# Patient Record
Sex: FEMALE | Race: AMERICAN INDIAN OR ALASKA NATIVE
[De-identification: names, ages, dates, MRNs, and addresses within clinical notes are randomized per-mention and may not be internally consistent; named-entity substitution may affect disease eponyms.]

---

## 2018-02-12 ENCOUNTER — HOSPITAL ENCOUNTER (EMERGENCY)
Dept: HOSPITAL 5 - ED | Age: 58
Discharge: HOME | End: 2018-02-12
Payer: SELF-PAY

## 2018-02-12 VITALS — SYSTOLIC BLOOD PRESSURE: 148 MMHG | DIASTOLIC BLOOD PRESSURE: 84 MMHG

## 2018-02-12 DIAGNOSIS — N39.0: Primary | ICD-10-CM

## 2018-02-12 DIAGNOSIS — M19.90: ICD-10-CM

## 2018-02-12 DIAGNOSIS — F17.200: ICD-10-CM

## 2018-02-12 LAB
ALBUMIN SERPL-MCNC: 4 G/DL (ref 3.9–5)
ALT SERPL-CCNC: 11 UNITS/L (ref 7–56)
BASOPHILS # (AUTO): 0 K/MM3 (ref 0–0.1)
BASOPHILS NFR BLD AUTO: 0.3 % (ref 0–1.8)
BILIRUB UR QL STRIP: (no result)
BLOOD UR QL VISUAL: (no result)
BUN SERPL-MCNC: 9 MG/DL (ref 7–17)
BUN/CREAT SERPL: 11 %
CALCIUM SERPL-MCNC: 9 MG/DL (ref 8.4–10.2)
EOSINOPHIL # BLD AUTO: 0 K/MM3 (ref 0–0.4)
EOSINOPHIL NFR BLD AUTO: 1 % (ref 0–4.3)
HCT VFR BLD CALC: 35.1 % (ref 30.3–42.9)
HEMOLYSIS INDEX: 7
HGB BLD-MCNC: 11.6 GM/DL (ref 10.1–14.3)
LYMPHOCYTES # BLD AUTO: 0.9 K/MM3 (ref 1.2–5.4)
LYMPHOCYTES NFR BLD AUTO: 26.7 % (ref 13.4–35)
MCH RBC QN AUTO: 29 PG (ref 28–32)
MCHC RBC AUTO-ENTMCNC: 33 % (ref 30–34)
MCV RBC AUTO: 87 FL (ref 79–97)
MONOCYTES # (AUTO): 0.4 K/MM3 (ref 0–0.8)
MONOCYTES % (AUTO): 11.4 % (ref 0–7.3)
NITRITE UR QL STRIP: (no result)
PH UR STRIP: 5 [PH] (ref 5–7)
PLATELET # BLD: 268 K/MM3 (ref 140–440)
RBC # BLD AUTO: 4.04 M/MM3 (ref 3.65–5.03)
RBC #/AREA URNS HPF: 112 /HPF (ref 0–6)
UROBILINOGEN UR-MCNC: < 2 MG/DL (ref ?–2)
WBC #/AREA URNS HPF: 25 /HPF (ref 0–6)

## 2018-02-12 PROCEDURE — 80053 COMPREHEN METABOLIC PANEL: CPT

## 2018-02-12 PROCEDURE — 96372 THER/PROPH/DIAG INJ SC/IM: CPT

## 2018-02-12 PROCEDURE — 36415 COLL VENOUS BLD VENIPUNCTURE: CPT

## 2018-02-12 PROCEDURE — 81001 URINALYSIS AUTO W/SCOPE: CPT

## 2018-02-12 PROCEDURE — 85025 COMPLETE CBC W/AUTO DIFF WBC: CPT

## 2018-02-12 PROCEDURE — 99283 EMERGENCY DEPT VISIT LOW MDM: CPT

## 2018-02-12 NOTE — EMERGENCY DEPARTMENT REPORT
ED Female  HPI





- General


Chief complaint: Back Pain/Injury


Stated complaint: BACK PAIN


Time Seen by Provider: 18 12:20


Source: patient


Mode of arrival: Ambulatory


Limitations: No Limitations





- History of Present Illness


Initial comments: 





This is a 57-year-old female nontoxic, well nourished in appearance, no acute 

signs of distress presents to the ED with c/o of dysuria, polyuria, urinary 

frequency and back pain x1 week. Patient describes pain as aching with level of 

8/10. Patient stated that back pain radiates to abdominal region. Patient also 

stated has history of arthirities and wants pain medications. Patient denies 

any trauma. Patient denies any hematuria, fever, chills, pelvic pain, chest pain

, shortness of breathe, numbness, tingling, headache, stiff neck.  Patient also 

stated wants to be treated emiprcally for possible STD. Patient denies any 

vaginal discharge or vaginal bleeding and stated that she had unprotective sex 

and wants to be treated. Patient denies any drug allergies. PMH includes brain 

aneurism. 


MD Complaint: dysuria


-: week(s) (1)


Radiation: non-radiating


Severity: mild


Severity scale (0 -10): 8


Quality: burning


Consistency: constant


Improves with: none


Worsens with: urination


Are you Pregnant Now?: No


Associated Symptoms: dysuria.  denies: vaginal discharge, vaginal bleeding, 

abdominal pain, nausea/vomiting, fever/chills, headaches, loss of appetite, 

hematuria, rash, seizure, shortness of breath, syncope, weakness





- Related Data


Sexually active: Yes


 Previous Rx's











 Medication  Instructions  Recorded  Last Taken  Type


 


ALBUTEROL Inhaler [ProAir HFA 2 puff IH QID PRN #1 inhalation 10/27/16 10/30/16 

Rx





Inhaler]    


 


traMADol [Ultram 50 MG tab] 50 mg PO Q6HR PRN #14 tablet 10/27/16 10/30/16 Rx


 


Levofloxacin [Levaquin] 750 mg PO QDAY #5 tablet 16 Unknown Rx


 


guaiFENesin ER [Mucinex ER] 600 mg PO BID #10 tablet 16 Unknown Rx


 


Sulfamethoxazole/Trimethoprim 1 each PO BID #14 tablet 16 Unknown Rx





[Bactrim DS TAB]    


 


Acetaminophen [Tylenol Arthritis] 650 mg PO Q8H PRN #30 tablet.er 18 

Unknown Rx


 


Ciprofloxacin HCl [Ciprofloxacin 500 mg PO Q12HR #14 tab 18 Unknown Rx





TAB]    











 Allergies











Allergy/AdvReac Type Severity Reaction Status Date / Time


 


No Known Allergies Allergy   Verified 16 08:53














ED Review of Systems


ROS: 


Stated complaint: BACK PAIN


Other details as noted in HPI





Constitutional: denies: chills, fever


Eyes: denies: eye pain, eye discharge, vision change


ENT: denies: ear pain, throat pain


Respiratory: denies: cough, shortness of breath, wheezing


Cardiovascular: denies: chest pain, palpitations


Endocrine: no symptoms reported


Gastrointestinal: denies: abdominal pain, nausea, diarrhea


Genitourinary: urgency, dysuria, frequency.  denies: hematuria, discharge


Musculoskeletal: back pain.  denies: joint swelling, arthralgia


Skin: denies: rash, lesions


Neurological: denies: headache, weakness, paresthesias


Psychiatric: denies: anxiety, depression


Hematological/Lymphatic: denies: easy bleeding, easy bruising





ED Past Medical Hx





- Past Medical History


Previous Medical History?: Yes


Hx Hypertension: No


Hx Heart Attack/AMI: No


Hx Congestive Heart Failure: No


Hx Diabetes: No


Hx Deep Vein Thrombosis: No


Hx Pulmonary Embolism: No


Hx Liver Disease: No


Hx Renal Disease: No


Hx Sickle Cell Disease: No


Hx Arthritis: No


Hx Seizures: No


Hx Kidney Stones: No


Hx Asthma: No


Hx COPD: No


Hx Tuberculosis: No


Hx Dementia: No


Hx HIV: No


Additional medical history: SAH.  brain aneurysm





- Surgical History


Past Surgical History?: Yes


Hx Coronary Stent: No


Hx Pacemaker: No


Hx Internal Defibrillator: No


Additional Surgical History: ANEURYSM REPAIR.   X 2





- Social History


Smoking Status: Current Every Day Smoker


Substance Use Type: Alcohol, Prescribed





- Medications


Home Medications: 


 Home Medications











 Medication  Instructions  Recorded  Confirmed  Last Taken  Type


 


ALBUTEROL Inhaler [ProAir HFA 2 puff IH QID PRN #1 inhalation 10/27/16 10/30/16 

10/30/16 Rx





Inhaler]     


 


traMADol [Ultram 50 MG tab] 50 mg PO Q6HR PRN #14 tablet 10/27/16 10/30/16 10/30

/16 Rx


 


Levofloxacin [Levaquin] 750 mg PO QDAY #5 tablet 16  Unknown Rx


 


guaiFENesin ER [Mucinex ER] 600 mg PO BID #10 tablet 16  Unknown Rx


 


Sulfamethoxazole/Trimethoprim 1 each PO BID #14 tablet 16  Unknown Rx





[Bactrim DS TAB]     


 


Acetaminophen [Tylenol Arthritis] 650 mg PO Q8H PRN #30 tablet.er 18  

Unknown Rx


 


Ciprofloxacin HCl [Ciprofloxacin 500 mg PO Q12HR #14 tab 18  Unknown Rx





TAB]     














ED Physical Exam





- General


Limitations: No Limitations


General appearance: alert, in no apparent distress





- Head


Head exam: Present: atraumatic, normocephalic





- Eye


Eye exam: Present: normal appearance, PERRL, EOMI


Pupils: Present: normal accommodation





- ENT


ENT exam: Present: normal exam, normal orophraynx, mucous membranes moist, TM's 

normal bilaterally, normal external ear exam





- Neck


Neck exam: Present: normal inspection, full ROM.  Absent: tenderness, 

meningismus, lymphadenopathy, thyromegaly





- Respiratory


Respiratory exam: Present: normal lung sounds bilaterally.  Absent: respiratory 

distress, wheezes, rales, rhonchi, stridor, chest wall tenderness, accessory 

muscle use, decreased breath sounds, prolonged expiratory





- Cardiovascular


Cardiovascular Exam: Present: regular rate, normal rhythm, normal heart sounds.

  Absent: bradycardia, tachycardia, irregular rhythm, systolic murmur, 

diastolic murmur, rubs, gallop





- GI/Abdominal


GI/Abdominal exam: Present: soft, normal bowel sounds.  Absent: distended, 

tenderness, guarding, rebound, rigid, diminished bowel sounds





- Expanded GI/Abdominal Exam


  ** Expanded


GI/Abdominal exam: Present: other (positive umbilical hernia that is reducible)

.  Absent: psoas sign, obturator sign, heel tap sign, Bernard's sign, Rovsing's 

sign, tenderness at [place], ascites





- Rectal


Rectal exam: Present: deferred





- Extremities Exam


Extremities exam: Present: normal inspection, full ROM, normal capillary 

refill.  Absent: tenderness, pedal edema, joint swelling, calf tenderness





- Back Exam


Back exam: Present: normal inspection, full ROM.  Absent: tenderness, CVA 

tenderness (R), CVA tenderness (L), muscle spasm, paraspinal tenderness, 

vertebral tenderness, rash noted





- Neurological Exam


Neurological exam: Present: alert, oriented X3, CN II-XII intact, normal gait, 

reflexes normal





- Psychiatric


Psychiatric exam: Present: normal affect, normal mood





- Skin


Skin exam: Present: warm, dry, intact, normal color.  Absent: rash





ED Course





 Vital Signs











  18





  10:19


 


Temperature 98.1 F


 


Pulse Rate 68


 


Respiratory 16





Rate 


 


Blood Pressure 148/84


 


O2 Sat by Pulse 98





Oximetry 














- Reevaluation(s)


Reevaluation #1: 





18 12:41


Patient is speaking in full sentences with no signs of distress noted.





- Consultations


Consultation #1: 





18 12:41


Patient has been consulted with Dr. Littlejohn about patient history, physical exam

, and labs and examined and screened patient and agrees to ED plan of care and 

discharge plan of care. 





ED Medical Decision Making





- Lab Data


Result diagrams: 


 18 10:45





 18 10:45





- Medical Decision Making





This is a 57-year-old female that presents with UTI. Patient is stable and was 

examined by me and Dr. Littlejohn. Labs within normal limits. UA indicates UTI. 

Normal abdominal exam. No CVA tenderness. Patient 1G azithromycin 1G Rocephine 

IV. Patient stated wants to be treated empirically for STD. Patient is 

discharged with cipro. Back pain and abdominal pain resolved after toradol. 

Patient stated feels much better.  Patient was instructed to Follow-up with a 

primary care doctor in 3-5 days or if symptoms worsen and continue return to 

emergency room as soon as possible.  At time of discharge, the patient does not 

seem toxic or ill in appearance.  No acute signs of distress noted.  Patient 

agrees to discharge treatment plan of care.  No further questions noted by the 

patient.


Critical care attestation.: 


If time is entered above; I have spent that time in minutes in the direct care 

of this critically ill patient, excluding procedure time.








ED Disposition


Clinical Impression: 


 Arthritis





UTI (urinary tract infection)


Qualifiers:


 Urinary tract infection type: site unspecified Hematuria presence: with 

hematuria Qualified Code(s): N39.0 - Urinary tract infection, site not specified

; R31.9 - Hematuria, unspecified; R31.9 - Hematuria, unspecified





Disposition: DC-01 TO HOME OR SELFCARE


Is pt being admited?: No


Does the pt Need Aspirin: No


Condition: Stable


Instructions:  Urinary Tract Infection in Women (ED), Ciprofloxacin (By mouth)


Additional Instructions: 


Follow-up with a primary care doctor in 3-5 days or if symptoms worsen and 

continue return to emergency room as soon as possible. 


Prescriptions: 


Acetaminophen [Tylenol Arthritis] 650 mg PO Q8H PRN #30 tablet.er


 PRN Reason: Pain


Ciprofloxacin HCl [Ciprofloxacin TAB] 500 mg PO Q12HR #14 tab


Referrals: 


PRIMARY CARE,MD [Primary Care Provider] - 3-5 Days


DAPHNE CORRALES MD [Staff Physician] - 3-5 Days


[facility] [place] [Outside] - 3-5 Days


[facility] [Outside] - 3-5 Days

## 2018-07-17 ENCOUNTER — HOSPITAL ENCOUNTER (EMERGENCY)
Dept: HOSPITAL 5 - ED | Age: 58
Discharge: HOME | End: 2018-07-17
Payer: MEDICAID

## 2018-07-17 VITALS — DIASTOLIC BLOOD PRESSURE: 87 MMHG | SYSTOLIC BLOOD PRESSURE: 156 MMHG

## 2018-07-17 DIAGNOSIS — F17.200: ICD-10-CM

## 2018-07-17 DIAGNOSIS — R10.13: Primary | ICD-10-CM

## 2018-07-17 PROCEDURE — 99282 EMERGENCY DEPT VISIT SF MDM: CPT

## 2018-07-17 NOTE — EMERGENCY DEPARTMENT REPORT
ED General Adult HPI





- General


Chief complaint: Pain General


Stated complaint: BACK/EAR/ABD PAIN


Time Seen by Provider: 18 10:21


Source: patient


Mode of arrival: Ambulatory


Limitations: No Limitations





- History of Present Illness


Initial comments: 





57-year-old female with a past medical history subarachnoid hemorrhage, an 

aneurysm and previous  presents to the hospital with multiple chronic 

complaints.





Complaint #1: Ongoing back pain and arthritis pain.  Patient has been 

experiencing arthritis pain 1 year.  Denies recent injury.  Denies fever, 

dysuria, hematuria, leg weakness, urinary incontinence.  She is currently not 

taking any medications for pain





Complaint #2: Epigastric pain.  Ongoing for 1-2 months.  Pain is aching, 

constant, worse with palpation.  Occasional nausea without vomiting, melena, 

hematochezia, or fever.





Patient also denies vaginal discharge, vaginal itching.  A she has a history of 

chronic constipation and takes stool softeners intermittently.  Last bowel 

movement was last night.





Patient not have a primary care doctor.





Primary medical record patient has a history of chronic alcoholism and chronic 

liver disease. She denies a history of seizures





- Related Data


 Previous Rx's











 Medication  Instructions  Recorded  Last Taken  Type


 


ALBUTEROL Inhaler [ProAir HFA 2 puff IH QID PRN #1 inhalation 10/27/16 10/30/16 

Rx





Inhaler]    


 


traMADol [Ultram 50 MG tab] 50 mg PO Q6HR PRN #14 tablet 10/27/16 10/30/16 Rx


 


Levofloxacin [Levaquin] 750 mg PO QDAY #5 tablet 16 Unknown Rx


 


guaiFENesin ER [Mucinex ER] 600 mg PO BID #10 tablet 16 Unknown Rx


 


Sulfamethoxazole/Trimethoprim 1 each PO BID #14 tablet 16 Unknown Rx





[Bactrim DS TAB]    


 


Acetaminophen [Tylenol Arthritis] 650 mg PO Q8H PRN #30 tablet.er 18 

Unknown Rx


 


Ciprofloxacin HCl [Ciprofloxacin 500 mg PO Q12HR #14 tab 18 Unknown Rx





TAB]    


 


Mag Hydrox/Aluminum Hyd/Simeth 20 ml PO QID PRN #1 bottle 18 Unknown Rx





[Maalox Advanced Suspension]    


 


Omeprazole Magnesium [PriLOSEC Otc] 20 mg PO QDAY #20 tablet. 18 

Unknown Rx


 


traMADol [Ultram 50 MG tab] 50 mg PO Q6HR PRN #20 tablet 18 Unknown Rx











 Allergies











Allergy/AdvReac Type Severity Reaction Status Date / Time


 


No Known Allergies Allergy   Verified 18 08:57














ED Review of Systems


ROS: 


Stated complaint: BACK/EAR/ABD PAIN


Other details as noted in HPI





Comment: All other systems reviewed and negative





ED Past Medical Hx





- Past Medical History


Hx Hypertension: No


Hx Heart Attack/AMI: No


Hx Congestive Heart Failure: No


Hx Diabetes: No


Hx Deep Vein Thrombosis: No


Hx Pulmonary Embolism: No


Hx Liver Disease: No


Hx Renal Disease: No


Hx Sickle Cell Disease: No


Hx Arthritis: No


Hx Seizures: No


Hx Kidney Stones: No


Hx Asthma: No


Hx COPD: No


Hx Tuberculosis: No


Hx Dementia: No


Hx HIV: No


Additional medical history: SAH.  brain aneurysm





- Surgical History


Hx Coronary Stent: No


Hx Pacemaker: No


Hx Internal Defibrillator: No


Additional Surgical History: ANEURYSM REPAIR.   X 2





- Social History


Smoking Status: Current Every Day Smoker


Substance Use Type: Alcohol





- Medications


Home Medications: 


 Home Medications











 Medication  Instructions  Recorded  Confirmed  Last Taken  Type


 


ALBUTEROL Inhaler [ProAir HFA 2 puff IH QID PRN #1 inhalation 10/27/16 10/30/16 

10/30/16 Rx





Inhaler]     


 


traMADol [Ultram 50 MG tab] 50 mg PO Q6HR PRN #14 tablet 10/27/16 10/30/16 10/30

/16 Rx


 


Levofloxacin [Levaquin] 750 mg PO QDAY #5 tablet 16  Unknown Rx


 


guaiFENesin ER [Mucinex ER] 600 mg PO BID #10 tablet 16  Unknown Rx


 


Sulfamethoxazole/Trimethoprim 1 each PO BID #14 tablet 16  Unknown Rx





[Bactrim DS TAB]     


 


Acetaminophen [Tylenol Arthritis] 650 mg PO Q8H PRN #30 tablet.er 18  

Unknown Rx


 


Ciprofloxacin HCl [Ciprofloxacin 500 mg PO Q12HR #14 tab 18  Unknown Rx





TAB]     


 


Mag Hydrox/Aluminum Hyd/Simeth 20 ml PO QID PRN #1 bottle 18  Unknown Rx





[Maalox Advanced Suspension]     


 


Omeprazole Magnesium [PriLOSEC Otc] 20 mg PO QDAY #20 tablet. 18  

Unknown Rx


 


traMADol [Ultram 50 MG tab] 50 mg PO Q6HR PRN #20 tablet 18  Unknown Rx














ED Physical Exam





- General


Limitations: No Limitations





- Other


Other exam information: 





General: No limitations, patient is alert in no acute distress


Head exam: Atraumatic, normocephalic


Eyes exam: Normal appearance


ENT: Moist mucous membrane


Neck exam: Normal inspection, full range of motion, no meningismus nontender


Respiratory exam: Clear to auscultation bilateral, no wheezes, rales, crackles


Cardiovascular: Normal rate and rhythm, normal heart sounds


Abdomen: Soft, nondistended, positive epigastric tenderness


Extremity: Full range of motion normal inspection no deformity


Back: Normal Inspection, full range of motion, midline tenderness


Neurologic: Alert, oriented x3, cranial nerves intact, no motor or sensory 

deficit


Psychiatric: normal affect, normal mood


Skin: Warm, dry, intact





ED Course


 Vital Signs











  18





  08:57


 


Temperature 98.4 F


 


Pulse Rate 75


 


Respiratory 16





Rate 


 


Blood Pressure 156/87


 


O2 Sat by Pulse 96





Oximetry 














ED Medical Decision Making





- Medical Decision Making





Patient has ongoing chronic complaints that would be managed symptomatically 

and patient encouraged to follow up with primary care doctor.  She was treated 

with Maalox and viscous lidocaine prior to discharge





- Differential Diagnosis


chronic pain, arthritis, PUD, gastritis


Critical Care Time: No


Critical care attestation.: 


If time is entered above; I have spent that time in minutes in the direct care 

of this critically ill patient, excluding procedure time.








ED Disposition


Clinical Impression: 


 Epigastric pain, Arthralgia





Disposition: DC-01 TO HOME OR SELFCARE


Is pt being admited?: No


Does the pt Need Aspirin: No


Condition: Stable


Instructions:  Gastritis (ED), Arthralgia (ED)


Additional Instructions: 


Take the medication as prescribed.  Follow with the primary care doctor and GI 

specialist for further evaluation.  Return if symptoms worsen as indicated by 

your discharge instructions


Prescriptions: 


Mag Hydrox/Aluminum Hyd/Simeth [Maalox Advanced Suspension] 20 ml PO QID PRN #1 

bottle


 PRN Reason: Indigestion


Omeprazole Magnesium [PriLOSEC Otc] 20 mg PO QDAY #20 tablet.


traMADol [Ultram 50 MG tab] 50 mg PO Q6HR PRN #20 tablet


 PRN Reason: Pain


Referrals: 


PRIMARY CARE,MD [Primary Care Provider] - 3-5 Days


[facility] [Provider Group] - 3-5 Days (Primary care clinic)


CHERRI CARRILLO MD [Staff Physician] - 3-5 Days (primary care doctor)


MONTSERRAT MCLAIN MD [Staff Physician] - 3-5 Days (GI specialist)


Time of Disposition: 10:44

## 2019-07-31 ENCOUNTER — HOSPITAL ENCOUNTER (EMERGENCY)
Dept: HOSPITAL 5 - ED | Age: 59
Discharge: HOME | End: 2019-07-31
Payer: MEDICAID

## 2019-07-31 VITALS — SYSTOLIC BLOOD PRESSURE: 139 MMHG | DIASTOLIC BLOOD PRESSURE: 62 MMHG

## 2019-07-31 DIAGNOSIS — M19.90: ICD-10-CM

## 2019-07-31 DIAGNOSIS — J40: Primary | ICD-10-CM

## 2019-07-31 DIAGNOSIS — N39.0: ICD-10-CM

## 2019-07-31 DIAGNOSIS — M54.5: ICD-10-CM

## 2019-07-31 DIAGNOSIS — Z79.1: ICD-10-CM

## 2019-07-31 DIAGNOSIS — F17.200: ICD-10-CM

## 2019-07-31 DIAGNOSIS — Z98.890: ICD-10-CM

## 2019-07-31 DIAGNOSIS — Z79.899: ICD-10-CM

## 2019-07-31 DIAGNOSIS — G89.29: ICD-10-CM

## 2019-07-31 DIAGNOSIS — J06.9: ICD-10-CM

## 2019-07-31 LAB
ALBUMIN SERPL-MCNC: 3.9 G/DL (ref 3.9–5)
ALT SERPL-CCNC: 15 UNITS/L (ref 7–56)
BACTERIA #/AREA URNS HPF: (no result) /HPF
BASOPHILS # (AUTO): 0 K/MM3 (ref 0–0.1)
BASOPHILS NFR BLD AUTO: 1.1 % (ref 0–1.8)
BILIRUB UR QL STRIP: (no result)
BLOOD UR QL VISUAL: (no result)
BUN SERPL-MCNC: 11 MG/DL (ref 7–17)
BUN/CREAT SERPL: 16 %
CALCIUM SERPL-MCNC: 9 MG/DL (ref 8.4–10.2)
EOSINOPHIL # BLD AUTO: 0 K/MM3 (ref 0–0.4)
EOSINOPHIL NFR BLD AUTO: 1.1 % (ref 0–4.3)
HCT VFR BLD CALC: 36.2 % (ref 30.3–42.9)
HEMOLYSIS INDEX: 171
HGB BLD-MCNC: 12 GM/DL (ref 10.1–14.3)
LYMPHOCYTES # BLD AUTO: 1.1 K/MM3 (ref 1.2–5.4)
LYMPHOCYTES NFR BLD AUTO: 28 % (ref 13.4–35)
MCHC RBC AUTO-ENTMCNC: 33 % (ref 30–34)
MCV RBC AUTO: 87 FL (ref 79–97)
MONOCYTES # (AUTO): 0.4 K/MM3 (ref 0–0.8)
MONOCYTES % (AUTO): 9.2 % (ref 0–7.3)
MUCOUS THREADS #/AREA URNS HPF: (no result) /HPF
PH UR STRIP: 5 [PH] (ref 5–7)
PLATELET # BLD: 287 K/MM3 (ref 140–440)
RBC # BLD AUTO: 4.19 M/MM3 (ref 3.65–5.03)
RBC #/AREA URNS HPF: 179 /HPF (ref 0–6)
TRICHOMONAS #/AREA URNS HPF: (no result) /HPF
UROBILINOGEN UR-MCNC: < 2 MG/DL (ref ?–2)
WBC #/AREA URNS HPF: 72 /HPF (ref 0–6)

## 2019-07-31 PROCEDURE — 80053 COMPREHEN METABOLIC PANEL: CPT

## 2019-07-31 PROCEDURE — 94640 AIRWAY INHALATION TREATMENT: CPT

## 2019-07-31 PROCEDURE — 87086 URINE CULTURE/COLONY COUNT: CPT

## 2019-07-31 PROCEDURE — 36415 COLL VENOUS BLD VENIPUNCTURE: CPT

## 2019-07-31 PROCEDURE — 94644 CONT INHLJ TX 1ST HOUR: CPT

## 2019-07-31 PROCEDURE — 81001 URINALYSIS AUTO W/SCOPE: CPT

## 2019-07-31 PROCEDURE — 71046 X-RAY EXAM CHEST 2 VIEWS: CPT

## 2019-07-31 PROCEDURE — 85025 COMPLETE CBC W/AUTO DIFF WBC: CPT

## 2019-07-31 NOTE — EMERGENCY DEPARTMENT REPORT
HPI





- General


Chief Complaint: Abdominal Pain


Time Seen by Provider: 19 09:20





- HPI


HPI: 





58-year-old  female presents to the emergency department with a 

few different complaints.  First, the patient complains of a two-week history of

a productive sounding cough but says that she does not bring up anything.  She 

is a tobacco smoker.  She denies any fever, wheezing, shortness of breath, chest

pain.  Secondly, the patient complains of some environmental allergies that are 

making her eyes "puffy."  Thirdly, the patient complains of some chronic mid to 

low back pain that she says is due to osteoarthritis.  She has not taken anythi

ng for any of her symptoms.  She does not have a primary care physician.  No 

recent travel or sick contacts at home.





ED Past Medical Hx





- Past Medical History


Previous Medical History?: Yes


Hx Hypertension: No


Hx Heart Attack/AMI: No


Hx Congestive Heart Failure: No


Hx Diabetes: No


Hx Deep Vein Thrombosis: No


Hx Pulmonary Embolism: No


Hx Liver Disease: No


Hx Renal Disease: No


Hx Sickle Cell Disease: No


Hx Arthritis: Yes


Hx Seizures: No


Hx Kidney Stones: No


Hx Asthma: No


Hx COPD: No


Hx Tuberculosis: No


Hx Dementia: No


Hx HIV: No


Additional medical history: SAH.  brain aneurysm





- Surgical History


Past Surgical History?: Yes


Hx Coronary Stent: No


Hx Pacemaker: No


Hx Internal Defibrillator: No


Additional Surgical History: ANEURYSM REPAIR.   X 2





- Social History


Smoking Status: Current Every Day Smoker


Substance Use Type: None





- Medications


Home Medications: 


                                Home Medications











 Medication  Instructions  Recorded  Confirmed  Last Taken  Type


 


traMADol [Ultram 50 MG tab] 50 mg PO Q6HR PRN #14 tablet 10/27/16 10/30/16 10/3

0/16 Rx


 


levoFLOXacin [Levaquin] 750 mg PO QDAY #5 tablet 16  Unknown Rx


 


Acetaminophen [Tylenol Arthritis] 650 mg PO Q8H PRN #30 tablet.er 18  

Unknown Rx


 


Ciprofloxacin HCl [Ciprofloxacin 500 mg PO Q12HR #14 tab 18  Unknown Rx





TAB]     


 


Mag Hydrox/Aluminum Hyd/Simeth 20 ml PO QID PRN #1 bottle 18  Unknown Rx





[Maalox Advanced Suspension]     


 


Omeprazole Magnesium [PriLOSEC Otc] 20 mg PO QDAY #20 tablet. 18  

Unknown Rx


 


traMADol [Ultram 50 MG tab] 50 mg PO Q6HR PRN #20 tablet 18  Unknown Rx


 


ALBUTEROL Inhaler (OR & NICU) 2 puff IH QID PRN #1 inhalation 19  Unknown 

Rx





[ProAir HFA Inhaler]     


 


Ibuprofen [Motrin 400 MG tab] 400 mg PO Q8H PRN #20 tablet 19  Unknown Rx


 


Sulfamethoxazole/Trimethoprim 1 each PO BID #14 tablet 19  Unknown Rx





[Bactrim DS TAB]     


 


guaiFENesin ER [Mucinex ER] 600 mg PO BID #10 tablet 19  Unknown Rx














ED Review of Systems


ROS: 


Stated complaint: BACK/THROAT/ABD PAIN


Other details as noted in HPI





Constitutional: denies: chills, fever


Eyes: denies: eye pain, vision change


ENT: congestion.  denies: ear pain


Respiratory: cough.  denies: shortness of breath


Cardiovascular: denies: chest pain, palpitations


Gastrointestinal: denies: nausea, vomiting


Genitourinary: denies: dysuria, frequency


Musculoskeletal: back pain.  denies: joint swelling


Skin: denies: rash, lesions


Neurological: denies: headache, weakness





Physical Exam





- Physical Exam


Vital Signs: 


                                   Vital Signs











  19





  08:56 09:06


 


Temperature 98.4 F 


 


Pulse Rate 68 


 


Respiratory 16 





Rate  


 


Blood Pressure 148/87 


 


O2 Sat by Pulse  96





Oximetry  











Physical Exam: 





GENERAL: The patient is well-developed well-nourished.


HENT: Normocephalic.  Atraumatic.    Patient has moist mucous membranes.


EYES: Extraocular motions are intact.  Pupils equal reactive to light 

bilaterally.


NECK: Supple.  Trachea is midline.


CHEST/LUNGS: Clear to auscultation.  There is no respiratory distress noted.


HEART/CARDIOVASCULAR: Regular.  There is no tachycardia.  There is no murmur.


ABDOMEN: Abdomen is soft, nontender.  Patient has normal bowel sounds.  There is

no abdominal distention.


SKIN: Skin is warm and dry.


NEURO: The patient is awake, alert, and oriented.  The patient is cooperative.  

The patient has no focal neurologic deficits.  The patient has normal speech.


MUSCULOSKELETAL: There is no tenderness or deformity.  There is no limitation 

range of motion.  There is no evidence of acute injury.  Muscle strength 5/5 

upper and lower extremities bilaterally.


BACK: There is both lumbar midline and bilateral paraspinal mild tenderness to 

palpation.  No step-off or deformity.





ED Course


                                   Vital Signs











  19





  08:56 09:06


 


Temperature 98.4 F 


 


Pulse Rate 68 


 


Respiratory 16 





Rate  


 


Blood Pressure 148/87 


 


O2 Sat by Pulse  96





Oximetry  














ED Medical Decision Making





- Lab Data


Result diagrams: 


                                 19 09:19





                                 19 09:19





- Medical Decision Making





Regarding the patient's upper respiratory type symptoms, a chest x-ray was done 

that does not show any pleural effusions, pneumonia, pneumothorax, focal 

consolidation, or any other acute process.  Heart and lungs are normal to 

auscultation on physical examination.  Vital signs are stable throughout her ED 

course including being afebrile.  No signs of any respiratory distress.  She 

most likely has a little upper respiratory infection.  She was given a DuoNeb 

and some Mucinex and upon reevaluation she is feeling improved.





Plan the patient's low back pain, the patient says that it is acute on chronic 

and atraumatic and most likely related to osteoarthritis.  She has full range of

motion to both upper and lower extremities and was seen ambulatory in the 

emergency department and appears stable.  She denies any problems with bowel or 

bladder, numbness or paresthesias or any neurological deficits.  She appears low

suspicion for any of the emergent condition such as cauda equina, epidural 

abscess or cord compression syndrome.





The patient will be given some ibuprofen for her back pain.  She'll be given a 

prescription for an albuterol inhaler and Mucinex.





The patient's labs show a urinary tract infection so she will be placed on 

antibiotics and since there was some hematuria seen, she will also be given a 

referral for urology.  The patient will return to the ER with any worsening of 

her symptoms or any acute distress.





- Differential Diagnosis


osteoarthritis, lumbar strain, URI, bronchitis, pneumonia


Critical Care Time: No


Critical care attestation.: 


If time is entered above; I have spent that time in minutes in the direct care 

of this critically ill patient, excluding procedure time.








ED Disposition


Clinical Impression: 


 Bronchitis





Upper respiratory infection


Qualifiers:


 URI type: unspecified viral URI Qualified Code(s): J06.9 - Acute upper 

respiratory infection, unspecified





UTI (urinary tract infection)


Qualifiers:


 Urinary tract infection type: acute cystitis Hematuria presence: with hematuria

Qualified Code(s): N30.01 - Acute cystitis with hematuria





Hematuria


Qualifiers:


 Hematuria type: benign essential microscopic Qualified Code(s): R31.1 - Benign 

essential microscopic hematuria





Disposition:  TO HOME OR SELFCARE


Is pt being admited?: No


Condition: Stable


Instructions:  Urinary Tract Infection in Women (ED), Upper Respiratory 

Infection (ED), Acute Bronchitis (ED), Acute Hematuria (ED)


Additional Instructions: 


Please follow up with a primary care physician in the next few days.  I am 

giving you a referral for a local orthopedist, Dr. Gusman, to follow up 

regarding your chronic back pains.  I am also giving you a referral for a local 

urologist, Dr. Mera, to follow up regarding the blood seen in your urine.  

Return to the emergency Department with any worsening of your symptoms or any 

acute distress.


Prescriptions: 


Sulfamethoxazole/Trimethoprim [Bactrim DS TAB] 1 each PO BID #14 tablet


Ibuprofen [Motrin 400 MG tab] 400 mg PO Q8H PRN #20 tablet


 PRN Reason: Pain , Severe (7-10)


guaiFENesin ER [Mucinex ER] 600 mg PO BID #10 tablet


ALBUTEROL Inhaler (OR & NICU) [ProAir HFA Inhaler] 2 puff IH QID PRN #1 

inhalation


 PRN Reason: Shortness Of Breath


Referrals: 


[facility] [Outside] - 3-5 Days


SHAWNEE GORDON MD [Staff Physician] - 3-5 Days


BARTOLO MERA MD [Staff Physician] - 3-5 Days


KULWANT GUSMAN MD [Staff Physician] - 3-5 Days

## 2019-07-31 NOTE — XRAY REPORT
CHEST 2 VIEWS 



INDICATION: 

cough.



COMPARISON: 

11/2/2016



FINDINGS:

Support devices: None.



Heart: Normal.

Pulmonary vasculature: Normal. 

Lungs/pleura: Clear lungs. No pleural effusion.  No pneumothorax.



Additional findings: Mild degenerative change in the spine.



IMPRESSION:

1. No acute cardiopulmonary process.



Signer Name: García Avila MD 

Signed: 7/31/2019 11:04 AM

 Workstation Name: [id number]

## 2019-09-27 ENCOUNTER — HOSPITAL ENCOUNTER (INPATIENT)
Dept: HOSPITAL 5 - ED | Age: 59
LOS: 4 days | Discharge: HOME | DRG: 871 | End: 2019-10-01
Attending: INTERNAL MEDICINE | Admitting: INTERNAL MEDICINE
Payer: MEDICAID

## 2019-09-27 DIAGNOSIS — F14.10: ICD-10-CM

## 2019-09-27 DIAGNOSIS — F10.10: ICD-10-CM

## 2019-09-27 DIAGNOSIS — Z79.899: ICD-10-CM

## 2019-09-27 DIAGNOSIS — Z71.51: ICD-10-CM

## 2019-09-27 DIAGNOSIS — A41.9: Primary | ICD-10-CM

## 2019-09-27 DIAGNOSIS — J18.1: ICD-10-CM

## 2019-09-27 DIAGNOSIS — Z71.6: ICD-10-CM

## 2019-09-27 DIAGNOSIS — F17.210: ICD-10-CM

## 2019-09-27 DIAGNOSIS — J44.1: ICD-10-CM
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
## 2019-09-27 DIAGNOSIS — Z23: ICD-10-CM

## 2019-09-27 DIAGNOSIS — J44.0: ICD-10-CM

## 2019-09-27 LAB
ALBUMIN SERPL-MCNC: 3.6 G/DL (ref 3.9–5)
ALT SERPL-CCNC: 8 UNITS/L (ref 7–56)
APTT BLD: 35.1 SEC. (ref 24.2–36.6)
BASOPHILS # (AUTO): 0 K/MM3 (ref 0–0.1)
BASOPHILS NFR BLD AUTO: 0.3 % (ref 0–1.8)
BILIRUB DIRECT SERPL-MCNC: 0.2 MG/DL (ref 0–0.2)
BUN SERPL-MCNC: 13 MG/DL (ref 7–17)
BUN/CREAT SERPL: 14 %
CALCIUM SERPL-MCNC: 9.2 MG/DL (ref 8.4–10.2)
EOSINOPHIL # BLD AUTO: 0 K/MM3 (ref 0–0.4)
EOSINOPHIL NFR BLD AUTO: 0.1 % (ref 0–4.3)
HCT VFR BLD CALC: 31.7 % (ref 30.3–42.9)
HEMOLYSIS INDEX: 0
HGB BLD-MCNC: 10.4 GM/DL (ref 10.1–14.3)
INR PPP: 1.11 (ref 0.87–1.13)
LYMPHOCYTES # BLD AUTO: 0.6 K/MM3 (ref 1.2–5.4)
LYMPHOCYTES NFR BLD AUTO: 5.8 % (ref 13.4–35)
MCHC RBC AUTO-ENTMCNC: 33 % (ref 30–34)
MCV RBC AUTO: 86 FL (ref 79–97)
MONOCYTES # (AUTO): 0.7 K/MM3 (ref 0–0.8)
MONOCYTES % (AUTO): 6.8 % (ref 0–7.3)
PLATELET # BLD: 225 K/MM3 (ref 140–440)
RBC # BLD AUTO: 3.71 M/MM3 (ref 3.65–5.03)

## 2019-09-27 PROCEDURE — 80307 DRUG TEST PRSMV CHEM ANLYZR: CPT

## 2019-09-27 PROCEDURE — 90732 PPSV23 VACC 2 YRS+ SUBQ/IM: CPT

## 2019-09-27 PROCEDURE — 83735 ASSAY OF MAGNESIUM: CPT

## 2019-09-27 PROCEDURE — 99406 BEHAV CHNG SMOKING 3-10 MIN: CPT

## 2019-09-27 PROCEDURE — 87040 BLOOD CULTURE FOR BACTERIA: CPT

## 2019-09-27 PROCEDURE — 71045 X-RAY EXAM CHEST 1 VIEW: CPT

## 2019-09-27 PROCEDURE — 94640 AIRWAY INHALATION TREATMENT: CPT

## 2019-09-27 PROCEDURE — 81001 URINALYSIS AUTO W/SCOPE: CPT

## 2019-09-27 PROCEDURE — 80053 COMPREHEN METABOLIC PANEL: CPT

## 2019-09-27 PROCEDURE — 36415 COLL VENOUS BLD VENIPUNCTURE: CPT

## 2019-09-27 PROCEDURE — 82140 ASSAY OF AMMONIA: CPT

## 2019-09-27 PROCEDURE — 80076 HEPATIC FUNCTION PANEL: CPT

## 2019-09-27 PROCEDURE — 83036 HEMOGLOBIN GLYCOSYLATED A1C: CPT

## 2019-09-27 PROCEDURE — 96366 THER/PROPH/DIAG IV INF ADDON: CPT

## 2019-09-27 PROCEDURE — 85730 THROMBOPLASTIN TIME PARTIAL: CPT

## 2019-09-27 PROCEDURE — 84484 ASSAY OF TROPONIN QUANT: CPT

## 2019-09-27 PROCEDURE — 85610 PROTHROMBIN TIME: CPT

## 2019-09-27 PROCEDURE — 82553 CREATINE MB FRACTION: CPT

## 2019-09-27 PROCEDURE — 93010 ELECTROCARDIOGRAM REPORT: CPT

## 2019-09-27 PROCEDURE — 82550 ASSAY OF CK (CPK): CPT

## 2019-09-27 PROCEDURE — 85025 COMPLETE CBC W/AUTO DIFF WBC: CPT

## 2019-09-27 PROCEDURE — 93005 ELECTROCARDIOGRAM TRACING: CPT

## 2019-09-27 PROCEDURE — 96365 THER/PROPH/DIAG IV INF INIT: CPT

## 2019-09-27 PROCEDURE — 96361 HYDRATE IV INFUSION ADD-ON: CPT

## 2019-09-27 PROCEDURE — 80048 BASIC METABOLIC PNL TOTAL CA: CPT

## 2019-09-27 RX ADMIN — FAMOTIDINE SCH MG: 20 TABLET ORAL at 21:14

## 2019-09-27 RX ADMIN — SODIUM CHLORIDE SCH MLS/HR: 0.9 INJECTION, SOLUTION INTRAVENOUS at 21:15

## 2019-09-27 RX ADMIN — Medication SCH ML: at 21:15

## 2019-09-27 RX ADMIN — CEFTRIAXONE SODIUM SCH MLS/HR: 2 INJECTION, POWDER, FOR SOLUTION INTRAMUSCULAR; INTRAVENOUS at 23:58

## 2019-09-27 RX ADMIN — OXYCODONE AND ACETAMINOPHEN PRN TAB: 5; 325 TABLET ORAL at 21:15

## 2019-09-27 RX ADMIN — AZITHROMYCIN SCH MLS/HR: 500 INJECTION, POWDER, LYOPHILIZED, FOR SOLUTION INTRAVENOUS at 21:27

## 2019-09-27 NOTE — EMERGENCY DEPARTMENT REPORT
ED General Adult HPI





- General


Chief complaint: Upper Respiratory Infection


Stated complaint: WEAKNESS/COUGH


Time Seen by Provider: 19 10:26


Source: EMS


Mode of arrival: Stretcher


Limitations: No Limitations





- History of Present Illness


Initial comments: 


This is a 58-year-old female who has been admitted to this facility for left 

lower lobe pneumonia, cachexia, alcohol abuse 3 diagnoses she now denies.  She 

states that she has never had pneumonia before.  She was also noted on several 

occasions to have lymphopenia.  I do not see an HIV test on the laboratory 

database.  Indeed, the patient states that she has never been tested for HIV.  

She does not have a current physician.  She does not report a history of PPD 

status.





The patient states that she has been coughing with right-sided chest pain on 

cough for the past several days.  She did not take her temperature but she was 

noted to have a fever on arrival.  She states the cough is generally 

nonproductive.





-: Gradual, days(s)


Location: chest, right


Radiation: non-radiation


Severity scale (0 -10): 6


Quality: aching


Consistency: intermittent (on cough)


Improves with: none


Worsens with: none


Associated Symptoms: fever/chills, weakness


Treatments Prior to Arrival: none





- Related Data


                                  Previous Rx's











 Medication  Instructions  Recorded  Last Taken  Type


 


traMADol [Ultram 50 MG tab] 50 mg PO Q6HR PRN #14 tablet 10/27/16 10/30/16 Rx


 


levoFLOXacin [Levaquin] 750 mg PO QDAY #5 tablet 16 Unknown Rx


 


Acetaminophen [Tylenol Arthritis] 650 mg PO Q8H PRN #30 tablet.er 18 

Unknown Rx


 


Ciprofloxacin HCl [Ciprofloxacin 500 mg PO Q12HR #14 tab 18 Unknown Rx





TAB]    


 


Mag Hydrox/Aluminum Hyd/Simeth 20 ml PO QID PRN #1 bottle 18 Unknown Rx





[Maalox Advanced Suspension]    


 


Omeprazole Magnesium [PriLOSEC Otc] 20 mg PO QDAY #20 tablet. 18 Unknown

Rx


 


traMADol [Ultram 50 MG tab] 50 mg PO Q6HR PRN #20 tablet 18 Unknown Rx


 


ALBUTEROL Inhaler (OR & NICU) 2 puff IH QID PRN #1 inhalation 19 Unknown 

Rx





[ProAir HFA Inhaler]    


 


Ibuprofen [Motrin 400 MG tab] 400 mg PO Q8H PRN #20 tablet 19 Unknown Rx


 


Sulfamethoxazole/Trimethoprim 1 each PO BID #14 tablet 19 Unknown Rx





[Bactrim DS TAB]    


 


guaiFENesin ER [Mucinex ER] 600 mg PO BID #10 tablet 19 Unknown Rx











                                    Allergies











Allergy/AdvReac Type Severity Reaction Status Date / Time


 


No Known Allergies Allergy   Verified 18 08:57














ED Review of Systems


ROS: 


Stated complaint: WEAKNESS/COUGH


Other details as noted in HPI





Constitutional: denies: chills, fever


Eyes: denies: eye pain, eye discharge, vision change


ENT: denies: ear pain, throat pain


Respiratory: see HPI, cough, shortness of breath.  denies: wheezing


Cardiovascular: as per HPI, chest pain.  denies: palpitations


Endocrine: no symptoms reported


Gastrointestinal: denies: abdominal pain, nausea, diarrhea


Genitourinary: denies: urgency, dysuria, discharge


Musculoskeletal: denies: back pain, joint swelling, arthralgia


Skin: denies: rash, lesions


Neurological: denies: headache, weakness, paresthesias


Psychiatric: denies: anxiety, depression


Hematological/Lymphatic: denies: easy bleeding, easy bruising





ED Past Medical Hx





- Past Medical History


Hx Hypertension: No


Hx Heart Attack/AMI: No


Hx Congestive Heart Failure: No


Hx Diabetes: No


Hx Deep Vein Thrombosis: No


Hx Pulmonary Embolism: No


Hx Liver Disease: No


Hx Renal Disease: No


Hx Sickle Cell Disease: No


Hx Arthritis: Yes


Hx Seizures: No


Hx Kidney Stones: No


Hx Asthma: No


Hx COPD: No


Hx Tuberculosis: No


Hx Dementia: No


Hx HIV: No


Additional medical history: SAH.  brain aneurysm.  See HPI





- Surgical History


Past Surgical History?: Yes


Hx Coronary Stent: No


Hx Pacemaker: No


Hx Internal Defibrillator: No


Additional Surgical History: ANEURYSM REPAIR.   X 2





- Social History


Smoking Status: Current Every Day Smoker


Substance Use Type: Alcohol





- Medications


Home Medications: 


                                Home Medications











 Medication  Instructions  Recorded  Confirmed  Last Taken  Type


 


traMADol [Ultram 50 MG tab] 50 mg PO Q6HR PRN #14 tablet 10/27/16 10/30/16 

10/30/16 Rx


 


levoFLOXacin [Levaquin] 750 mg PO QDAY #5 tablet 16  Unknown Rx


 


Acetaminophen [Tylenol Arthritis] 650 mg PO Q8H PRN #30 tablet.er 18  

Unknown Rx


 


Ciprofloxacin HCl [Ciprofloxacin 500 mg PO Q12HR #14 tab 18  Unknown Rx





TAB]     


 


Mag Hydrox/Aluminum Hyd/Simeth 20 ml PO QID PRN #1 bottle 18  Unknown Rx





[Maalox Advanced Suspension]     


 


Omeprazole Magnesium [PriLOSEC Otc] 20 mg PO QDAY #20 tablet.dr 18  

Unknown Rx


 


traMADol [Ultram 50 MG tab] 50 mg PO Q6HR PRN #20 tablet 18  Unknown Rx


 


ALBUTEROL Inhaler (OR & NICU) 2 puff IH QID PRN #1 inhalation 19  Unknown 

Rx





[ProAir HFA Inhaler]     


 


Ibuprofen [Motrin 400 MG tab] 400 mg PO Q8H PRN #20 tablet 19  Unknown Rx


 


Sulfamethoxazole/Trimethoprim 1 each PO BID #14 tablet 19  Unknown Rx





[Bactrim DS TAB]     


 


guaiFENesin ER [Mucinex ER] 600 mg PO BID #10 tablet 19  Unknown Rx














ED Physical Exam





- General


Limitations: No Limitations


General appearance: alert, in no apparent distress, cachectic (somewhat)





- Head


Head exam: Present: atraumatic, normocephalic





- Eye


Eye exam: Present: normal appearance, PERRL, EOMI.  Absent: scleral icterus





- ENT


ENT exam: Present: mucous membranes moist





- Neck


Neck exam: Present: normal inspection.  Absent: tenderness, meningismus





- Respiratory


Respiratory exam: Present: normal lung sounds bilaterally.  Absent: respiratory 

distress





- Cardiovascular


Cardiovascular Exam: Present: regular rate, normal rhythm.  Absent: systolic 

murmur, diastolic murmur, rubs, gallop





- GI/Abdominal


GI/Abdominal exam: Present: soft, normal bowel sounds.  Absent: distended, 

tenderness, guarding, rebound





- Extremities Exam


Extremities exam: Present: normal inspection





- Back Exam


Back exam: Present: normal inspection.  Absent: CVA tenderness (R), CVA 

tenderness (L)





- Neurological Exam


Neurological exam: Present: alert, oriented X3, CN II-XII intact.  Absent: motor

sensory deficit





- Psychiatric


Psychiatric exam: Present: normal affect, normal mood





- Skin


Skin exam: Present: warm, dry, intact, normal color.  Absent: rash





ED Course


                                   Vital Signs











  19





  09:58


 


Temperature 102.7 F H


 


Pulse Rate 101 H


 


Respiratory 16





Rate 


 


Blood Pressure 137/72


 


O2 Sat by Pulse 98





Oximetry 














- Reevaluation(s)


Reevaluation #1: 


8-year-old female diabetic bilateral pneumonia.  The pneumonia is recurrent.  I 

believe HIV testing is prudent.  She is referred to the hospitalist service for 

further care and evaluation and treatment as an inpatient.  She has some risk 

factors for poor outcome to include cachexia, chronic alcohol abuse, chronic 

lymphopenia.  We'll begin treatment with ceftriaxone and Levaquin.


19 12:18








ED Medical Decision Making





- Lab Data


Result diagrams: 


                                 19 11:01





                                 19 11:01








                         Laboratory Results - last 24 hr











  19





  11:01 11:01 11:01


 


WBC  9.8  


 


RBC  3.71  


 


Hgb  10.4  


 


Hct  31.7  


 


MCV  86  


 


MCH  28  


 


MCHC  33  


 


RDW  13.8  


 


Plt Count  225  


 


Lymph % (Auto)  5.8 L  


 


Mono % (Auto)  6.8  


 


Eos % (Auto)  0.1  


 


Baso % (Auto)  0.3  


 


Lymph #  0.6 L  


 


Mono #  0.7  


 


Eos #  0.0  


 


Baso #  0.0  


 


Seg Neutrophils %  87.0 H  


 


Seg Neutrophils #  8.5 H  


 


PT   14.0 


 


INR   1.11 


 


APTT   35.1 


 


Sodium    131 L


 


Potassium    4.1


 


Chloride    93.6 L


 


Carbon Dioxide    18 L


 


Anion Gap    24


 


BUN    13


 


Creatinine    0.9


 


Estimated GFR    > 60


 


BUN/Creatinine Ratio    14


 


Glucose    75


 


Lactic Acid   


 


Calcium    9.2


 


Magnesium    1.90


 


Total Bilirubin    0.60


 


Direct Bilirubin    0.2


 


Indirect Bilirubin    0.4


 


AST    32


 


ALT    8


 


Alkaline Phosphatase    60


 


Total Creatine Kinase    309 H


 


CK-MB (CK-2)    4.9 H


 


CK-MB (CK-2) Rel Index    1.5


 


Troponin T    0.025


 


Total Protein    9.7 H


 


Albumin    3.6 L


 


Albumin/Globulin Ratio    0.6














  19





  11:01


 


WBC 


 


RBC 


 


Hgb 


 


Hct 


 


MCV 


 


MCH 


 


MCHC 


 


RDW 


 


Plt Count 


 


Lymph % (Auto) 


 


Mono % (Auto) 


 


Eos % (Auto) 


 


Baso % (Auto) 


 


Lymph # 


 


Mono # 


 


Eos # 


 


Baso # 


 


Seg Neutrophils % 


 


Seg Neutrophils # 


 


PT 


 


INR 


 


APTT 


 


Sodium 


 


Potassium 


 


Chloride 


 


Carbon Dioxide 


 


Anion Gap 


 


BUN 


 


Creatinine 


 


Estimated GFR 


 


BUN/Creatinine Ratio 


 


Glucose 


 


Lactic Acid  1.40


 


Calcium 


 


Magnesium 


 


Total Bilirubin 


 


Direct Bilirubin 


 


Indirect Bilirubin 


 


AST 


 


ALT 


 


Alkaline Phosphatase 


 


Total Creatine Kinase 


 


CK-MB (CK-2) 


 


CK-MB (CK-2) Rel Index 


 


Troponin T 


 


Total Protein 


 


Albumin 


 


Albumin/Globulin Ratio 














- Radiology Data


Radiology results: report reviewed (airspace dz c/w bilat pneumonia)


Critical care attestation.: 


If time is entered above; I have spent that time in minutes in the direct care 

of this critically ill patient, excluding procedure time.








ED Disposition


Clinical Impression: 


Bilateral pneumonia


Qualifiers:


 Pneumonia type: due to unspecified organism Lung location: lower lobe of lung 

Qualified Code(s): J18.1 - Lobar pneumonia, unspecified organism





Disposition:  OP ADMIT IP TO THIS HOSP


Is pt being admited?: Yes


Does the pt Need Aspirin: Yes


Condition: Stable


Instructions:  Bacterial Pneumonia (ED)


Referrals: 


PRIMARY CARE,MD [Primary Care Provider] - 3-5 Days


Time of Disposition: 12:18

## 2019-09-27 NOTE — XRAY REPORT
CHEST 1 VIEW 



INDICATION / CLINICAL INFORMATION:

Cough fever.



COMPARISON: 

7/31/2019



FINDINGS:



SUPPORT DEVICES: None.

HEART / MEDIASTINUM: No significant abnormality. 

LUNGS / PLEURA: There are bibasilar airspace opacities which could represent atelectasis or evolving 
pneumonia..  No pneumothorax. 



ADDITIONAL FINDINGS: No significant additional findings.



IMPRESSION:

1. There is bibasilar airspace disease which could represent atelectasis or pneumonia



Signer Name: Jameson Oseguera MD 

Signed: 9/27/2019 12:04 PM

 Workstation Name: [id number]

## 2019-09-28 LAB
ALBUMIN SERPL-MCNC: 3.1 G/DL (ref 3.9–5)
ALT SERPL-CCNC: 12 UNITS/L (ref 7–56)
BACTERIA #/AREA URNS HPF: (no result) /HPF
BASOPHILS # (AUTO): 0 K/MM3 (ref 0–0.1)
BASOPHILS # (AUTO): 0 K/MM3 (ref 0–0.1)
BASOPHILS NFR BLD AUTO: 0.3 % (ref 0–1.8)
BASOPHILS NFR BLD AUTO: 0.5 % (ref 0–1.8)
BENZODIAZEPINES SCREEN,URINE: (no result)
BILIRUB UR QL STRIP: (no result)
BLOOD UR QL VISUAL: (no result)
BUN SERPL-MCNC: 9 MG/DL (ref 7–17)
BUN/CREAT SERPL: 13 %
CALCIUM SERPL-MCNC: 8.4 MG/DL (ref 8.4–10.2)
EOSINOPHIL # BLD AUTO: 0 K/MM3 (ref 0–0.4)
EOSINOPHIL # BLD AUTO: 0 K/MM3 (ref 0–0.4)
EOSINOPHIL NFR BLD AUTO: 0.2 % (ref 0–4.3)
EOSINOPHIL NFR BLD AUTO: 0.2 % (ref 0–4.3)
HCT VFR BLD CALC: 29.7 % (ref 30.3–42.9)
HCT VFR BLD CALC: 33.8 % (ref 30.3–42.9)
HEMOLYSIS INDEX: 0
HGB BLD-MCNC: 11 GM/DL (ref 10.1–14.3)
HGB BLD-MCNC: 9.5 GM/DL (ref 10.1–14.3)
LYMPHOCYTES # BLD AUTO: 0.5 K/MM3 (ref 1.2–5.4)
LYMPHOCYTES # BLD AUTO: 0.7 K/MM3 (ref 1.2–5.4)
LYMPHOCYTES NFR BLD AUTO: 6.5 % (ref 13.4–35)
LYMPHOCYTES NFR BLD AUTO: 7 % (ref 13.4–35)
MCHC RBC AUTO-ENTMCNC: 32 % (ref 30–34)
MCHC RBC AUTO-ENTMCNC: 32 % (ref 30–34)
MCV RBC AUTO: 86 FL (ref 79–97)
MCV RBC AUTO: 87 FL (ref 79–97)
METHADONE SCREEN,URINE: (no result)
MONOCYTES # (AUTO): 0.8 K/MM3 (ref 0–0.8)
MONOCYTES # (AUTO): 0.8 K/MM3 (ref 0–0.8)
MONOCYTES % (AUTO): 7.9 % (ref 0–7.3)
MONOCYTES % (AUTO): 9.6 % (ref 0–7.3)
OPIATE SCREEN,URINE: (no result)
PH UR STRIP: 6 [PH] (ref 5–7)
PLATELET # BLD: 236 K/MM3 (ref 140–440)
PLATELET # BLD: 243 K/MM3 (ref 140–440)
RBC # BLD AUTO: 3.43 M/MM3 (ref 3.65–5.03)
RBC # BLD AUTO: 3.89 M/MM3 (ref 3.65–5.03)
RBC #/AREA URNS HPF: 34 /HPF (ref 0–6)
UROBILINOGEN UR-MCNC: < 2 MG/DL (ref ?–2)
WBC #/AREA URNS HPF: 46 /HPF (ref 0–6)

## 2019-09-28 PROCEDURE — 3E0234Z INTRODUCTION OF SERUM, TOXOID AND VACCINE INTO MUSCLE, PERCUTANEOUS APPROACH: ICD-10-PCS | Performed by: INTERNAL MEDICINE

## 2019-09-28 RX ADMIN — SODIUM CHLORIDE SCH MLS/HR: 0.9 INJECTION, SOLUTION INTRAVENOUS at 09:46

## 2019-09-28 RX ADMIN — ACETAMINOPHEN PRN MG: 325 TABLET ORAL at 23:56

## 2019-09-28 RX ADMIN — IPRATROPIUM BROMIDE AND ALBUTEROL SULFATE SCH: .5; 3 SOLUTION RESPIRATORY (INHALATION) at 21:40

## 2019-09-28 RX ADMIN — ENOXAPARIN SODIUM SCH MG: 100 INJECTION SUBCUTANEOUS at 22:25

## 2019-09-28 RX ADMIN — ACETAMINOPHEN PRN MG: 325 TABLET ORAL at 05:47

## 2019-09-28 RX ADMIN — OXYCODONE AND ACETAMINOPHEN PRN TAB: 5; 325 TABLET ORAL at 22:35

## 2019-09-28 RX ADMIN — ACETAMINOPHEN PRN MG: 325 TABLET ORAL at 18:17

## 2019-09-28 RX ADMIN — ARFORMOTEROL TARTRATE SCH MCG: 15 SOLUTION RESPIRATORY (INHALATION) at 21:40

## 2019-09-28 RX ADMIN — FAMOTIDINE SCH MG: 20 TABLET ORAL at 22:26

## 2019-09-28 RX ADMIN — IPRATROPIUM BROMIDE AND ALBUTEROL SULFATE SCH AMPUL: .5; 3 SOLUTION RESPIRATORY (INHALATION) at 13:17

## 2019-09-28 RX ADMIN — Medication SCH ML: at 09:47

## 2019-09-28 RX ADMIN — AZITHROMYCIN SCH MLS/HR: 500 INJECTION, POWDER, LYOPHILIZED, FOR SOLUTION INTRAVENOUS at 22:35

## 2019-09-28 RX ADMIN — ACETAMINOPHEN PRN MG: 325 TABLET ORAL at 10:23

## 2019-09-28 RX ADMIN — CEFTRIAXONE SODIUM SCH MLS/HR: 2 INJECTION, POWDER, FOR SOLUTION INTRAMUSCULAR; INTRAVENOUS at 22:35

## 2019-09-28 RX ADMIN — FAMOTIDINE SCH MG: 20 TABLET ORAL at 09:47

## 2019-09-28 RX ADMIN — BUDESONIDE SCH MG: 0.5 INHALANT RESPIRATORY (INHALATION) at 21:40

## 2019-09-28 RX ADMIN — NICOTINE SCH MG: 14 PATCH TRANSDERMAL at 09:47

## 2019-09-28 NOTE — HISTORY AND PHYSICAL REPORT
CHIEF COMPLAINT:  Fever.  Cough productive of yellow sputum for the last 4-5

days.



HISTORY OF PRESENT ILLNESS:  A 58-year-old female who comes in for fever and

cough productive of yellow sputum.  The patient has been coughing with

right-sided chest pain for the past several days.  Feels feverish.  Did not take

temperature.  Cough productive of yellow sputum.  Occasional wheezing present. 

The patient apparently had a left lower lobe pneumonia in the past.  The patient

has never been tested for HIV.



PAST MEDICAL HISTORY:  Significant for asthma and arthritis, also subarachnoid

hemorrhage and brain aneurysm.



PAST SURGICAL HISTORY:  Aneurysm repair and  x 2.



SOCIAL HISTORY:  Smokes over a pack a day.



FAMILY HISTORY:  Hypertension.



REVIEW OF SYSTEMS:  Significant for fever, cough, right-sided chest pain for 1

week.  Otherwise, review of systems negative.



PHYSICAL EXAMINATION:

GENERAL:  Middle-aged female, cooperative during examination.

VITAL SIGNS:  Blood pressure was 109/77, temperature at the time of admission

was 102.7, pulse is 101, sats are 98%.

HEENT:  Unremarkable.  Pupils equal and reactive.

NECK:  Supple, no lymphadenopathy, no thyromegaly.

LUNGS:  Clear to auscultation and percussion.  Good air entry.  Occasional

rhonchi present bilaterally.

CARDIOVASCULAR:  S1, S2 heard.  No gallop, no murmur, no rub.  Apical impulse in

left fifth intercostal space and midclavicular line.

ABDOMEN:  Soft and benign.  No hepatosplenomegaly.  No guarding, no rigidity. 

Hernial orifices are normal.

EXTREMITIES:  Good pedal pulses.  No pedal edema.

CENTRAL NERVOUS SYSTEM:  Alert and oriented x 4, nonfocal exam.



DIAGNOSTIC DATA:  Chest x-ray shows bibasilar airspace disease with represent

atelectasis or pneumonia.  Echocardiogram is sinus tachycardia.  Heart rate of

100.  Second EKG shows heart rate of 89 per minute, no acute ST-T wave changes.



LABORATORY DATA:  Significant for normal white count, hemoglobin of 10.4 and

31.6, white count of 9800, platelet count is 225,000.  Sodium is 131,

bicarbonate is 18, BUN and creatinine 39 and 0.9.  Total CK is 319.



ASSESSMENT AND PLAN:

1.  Systemic inflammatory response syndrome.  The patient has a high temperature

of 102.7 and chest x-ray positive for bilateral pneumonia.  In view of fever and

tachycardia and bilateral pneumonia, clinical picture consistent with systemic

inflammatory response syndrome.  The patient initiated on IV ceftriaxone and IV

azithromycin for community-acquired pneumonia.

2.  Bilateral pneumonia.  As mentioned, the patient initiated on IV Rocephin and

IV azithromycin.  The patient also initiated on DuoNebs.  The patient has a

history of smoking.

3.  Chronic obstructive pulmonary disease.  DuoNebs q.i.d. and albuterol p.r.n.

4.  Nicotine dependence.  Nicoderm patch initiated.  She was counseled about

smoking cessation.

5.  Deep venous thrombosis prophylaxis, Lovenox 40 mg subcutaneous daily.





DD: 2019 

DT: 2019 

JOB# [id number]  [id number]

ANJEL/JAN

MTDILEANA

## 2019-09-28 NOTE — PROGRESS NOTE
Assessment and Plan


Assessment and plan: 





Sepsis


- Patient is being managed according to sepsis protocol with IV antibiotics and 

IV fluids


- Patient still had fever


- Follow blood cultures


Bilateral pneumonia


- Continue IV antibiotics


COPD exacerbation; on nebulizer treatment, DuoNeb's and when necessary oxygen


Nicotine dependence; patient is on nicotine patch and counseled about cessation


DVT prophylaxis


-On Lovenox


Disposition


-Continue inpatient care








History


Interval history: 





Patient was seen and evaluated this morning, patient is complaining fever and 

shortness of breath.





Hospitalist Physical





- Physical exam


Narrative exam: 


 Not in cardiopulmonary distress. 


 The patient appeared well nourished and normally developed.


 Vital signs as documented.


 Head exam is unremarkable.


 No scleral icterus .


 Neck is without jugular venous distension, thyromegaly, or carotid bruits. 


 Lungs rhonchi on the bibasilar areas.


Cardiac exam reveals regular rate and  Rhythm.


Abdominal exam reveals normal bowel sounds. 


Extremities are nonedematous and both femoral and pedal pulses are normal.


CNS: Alert and oriented 3.  No focal weakness.





- Constitutional


Vitals: 


                                        











Temp Pulse Resp BP Pulse Ox


 


 99.5 F   91 H  20   125/79   97 


 


 09/28/19 11:40  09/28/19 13:17  09/28/19 13:17  09/28/19 11:40  09/28/19 11:40














Results





- Labs


CBC & Chem 7: 


                                 09/28/19 06:06





                                 09/28/19 06:06


Labs: 


                             Laboratory Last Values











WBC  8.0 K/mm3 (4.5-11.0)   09/28/19  06:06    


 


RBC  3.43 M/mm3 (3.65-5.03)  L  09/28/19  06:06    


 


Hgb  9.5 gm/dl (10.1-14.3)  L  09/28/19  06:06    


 


Hct  29.7 % (30.3-42.9)  L  09/28/19  06:06    


 


MCV  86 fl (79-97)   09/28/19  06:06    


 


MCH  28 pg (28-32)   09/28/19  06:06    


 


MCHC  32 % (30-34)   09/28/19  06:06    


 


RDW  14.1 % (13.2-15.2)   09/28/19  06:06    


 


Plt Count  236 K/mm3 (140-440)   09/28/19  06:06    


 


Lymph % (Auto)  6.5 % (13.4-35.0)  L  09/28/19  06:06    


 


Mono % (Auto)  9.6 % (0.0-7.3)  H  09/28/19  06:06    


 


Eos % (Auto)  0.2 % (0.0-4.3)   09/28/19  06:06    


 


Baso % (Auto)  0.3 % (0.0-1.8)   09/28/19  06:06    


 


Lymph #  0.5 K/mm3 (1.2-5.4)  L  09/28/19  06:06    


 


Mono #  0.8 K/mm3 (0.0-0.8)   09/28/19  06:06    


 


Eos #  0.0 K/mm3 (0.0-0.4)   09/28/19  06:06    


 


Baso #  0.0 K/mm3 (0.0-0.1)   09/28/19  06:06    


 


Seg Neutrophils %  83.4 % (40.0-70.0)  H  09/28/19  06:06    


 


Seg Neutrophils #  6.7 K/mm3 (1.8-7.7)   09/28/19  06:06    


 


PT  14.0 Sec. (12.2-14.9)   09/27/19  11:01    


 


INR  1.11  (0.87-1.13)   09/27/19  11:01    


 


APTT  35.1 Sec. (24.2-36.6)   09/27/19  11:01    


 


Sodium  132 mmol/L (137-145)  L  09/28/19  06:06    


 


Potassium  3.8 mmol/L (3.6-5.0)   09/28/19  06:06    


 


Chloride  98.1 mmol/L ()   09/28/19  06:06    


 


Carbon Dioxide  18 mmol/L (22-30)  L  09/28/19  06:06    


 


  20 mmol/L  09/28/19  06:06    


 


BUN  9 mg/dL (7-17)   09/28/19  06:06    


 


  0.7 mg/dL (0.7-1.2)   09/28/19  06:06    


 


Estimated GFR  > 60 ml/min  09/28/19  06:06    


 


  13 %  09/28/19  06:06    


 


Glucose  88 mg/dL ()   09/28/19  06:06    


 


  5.0 % (4-6)   09/27/19  22:37    


 


Lactic Acid  1.40 mmol/L (0.7-2.0)   09/27/19  11:01    


 


Calcium  8.4 mg/dL (8.4-10.2)   09/28/19  06:06    


 


Magnesium  1.90 mg/dL (1.7-2.3)   09/27/19  11:01    


 


  0.20 mg/dL (0.1-1.2)   09/28/19  06:06    


 


  0.2 mg/dL (0-0.2)   09/27/19  11:01    


 


  0.4 mg/dL  09/27/19  11:01    


 


AST  44 units/L (5-40)  H  09/28/19  06:06    


 


ALT  12 units/L (7-56)   09/28/19  06:06    


 


  67 units/L ()   09/28/19  06:06    


 


  309 units/L ()  H  09/27/19  11:01    


 


CK-MB (CK-2)  4.9 ng/mL (0.0-4.0)  H  09/27/19  11:01    


 


CK-MB (CK-2) Rel Index  1.5  (0-4)   09/27/19  11:01    


 


  0.025 ng/mL (0.00-0.029)   09/27/19  11:01    


 


  8.4 g/dL (6.3-8.2)  H  09/28/19  06:06    


 


  3.1 g/dL (3.9-5)  L  09/28/19  06:06    


 


  0.6 %  09/28/19  06:06    














Active Medications





- Current Medications


Current Medications: 














Generic Name Dose Route Start Last Admin





  Trade Name Freq  PRN Reason Stop Dose Admin


 


Acetaminophen  650 mg  09/27/19 20:42  09/28/19 10:23





  Tylenol  PO   650 mg





  Q4H PRN   Administration





  Pain MILD(1-3)/Fever >100.5/HA  


 


Albuterol  2.5 mg  09/28/19 09:07 





  Proventil  IH  





  Q4HRT PRN  





  Shortness Of Breath  


 


Albuterol/Ipratropium  1 ampul  09/28/19 14:00  09/28/19 13:17





  Duoneb *Not For Prn Use*  IH   1 ampul





  Q6HRT EMILIE   Administration


 


Arformoterol Tartrate  15 mcg  09/28/19 20:00 





  Brovana Nebu  IH  





  Q12HRT EMILIE  


 


Budesonide  0.5 mg  09/28/19 20:00 





  Pulmicort  IH  





  Q12HRT EMILIE  


 


Enoxaparin Sodium  40 mg  09/28/19 22:00 





  Lovenox  SUB-Q  





  QDAY@2200 [facility]  


 


Famotidine  20 mg  09/27/19 22:00  09/28/19 09:47





  Pepcid  PO   20 mg





  BID EMILIE   Administration


 


Guaifenesin  600 mg  09/27/19 22:00  09/28/19 09:47





  Mucinex Er  PO   600 mg





  Q12HR [facility]   Administration


 


Hydromorphone HCl  0.5 mg  09/27/19 20:43 





  Dilaudid  IV  





  Q3H PRN  





  Pain , Severe (7-10)  


 


Ceftriaxone Sodium  2 gm in 100 mls @ 200 mls/hr  09/27/19 23:00  09/27/19 23:58





  Rocephin/Ns 2 Gm/100 Ml  IV   200 mls/hr





  Q24H [facility]   Administration





  Protocol  


 


Azithromycin 500 mg/ Sodium  250 mls @ 250 mls/hr  09/27/19 22:00  09/27/19 

21:27





  Chloride  IV   250 mls/hr





  Q24H [facility]   Administration





  Protocol  


 


Nicotine  14 mg  09/28/19 10:00  09/28/19 09:47





  Habitrol  TD   14 mg





  QDAY EMILIE   Administration


 


Ondansetron HCl  4 mg  09/27/19 20:42 





  Zofran  IV  





  Q8H PRN  





  Nausea And Vomiting  


 


Oxycodone/Acetaminophen  1 tab  09/27/19 20:43  09/27/19 21:15





  Percocet 5/325  PO   1 tab





  Q6H PRN   Administration





  Pain, Moderate (4-6)  


 


Pneumococcal Polyvalent Vaccine  0.5 ml  09/28/19 14:20 





  Pneumovax 23  IM  09/28/19 14:21 





  .ONCE ONE  


 


Sodium Chloride  10 ml  09/27/19 22:00  09/28/19 09:47





  Sodium Chloride Flush Syringe 10 Ml  IV   10 ml





  BID EMILIE   Administration


 


Sodium Chloride  10 ml  09/27/19 20:42 





  Sodium Chloride Flush Syringe 10 Ml  IV  





  PRN PRN  





  LINE FLUSH

## 2019-09-29 LAB
BASOPHILS # (AUTO): 0 K/MM3 (ref 0–0.1)
BASOPHILS NFR BLD AUTO: 0.4 % (ref 0–1.8)
BUN SERPL-MCNC: 6 MG/DL (ref 7–17)
BUN/CREAT SERPL: 10 %
CALCIUM SERPL-MCNC: 8.5 MG/DL (ref 8.4–10.2)
EOSINOPHIL # BLD AUTO: 0 K/MM3 (ref 0–0.4)
EOSINOPHIL NFR BLD AUTO: 0.3 % (ref 0–4.3)
HCT VFR BLD CALC: 28.7 % (ref 30.3–42.9)
HEMOLYSIS INDEX: 0
HGB BLD-MCNC: 9.6 GM/DL (ref 10.1–14.3)
LYMPHOCYTES # BLD AUTO: 0.8 K/MM3 (ref 1.2–5.4)
LYMPHOCYTES NFR BLD AUTO: 13.9 % (ref 13.4–35)
MCHC RBC AUTO-ENTMCNC: 33 % (ref 30–34)
MCV RBC AUTO: 86 FL (ref 79–97)
MONOCYTES # (AUTO): 0.6 K/MM3 (ref 0–0.8)
MONOCYTES % (AUTO): 10.5 % (ref 0–7.3)
PLATELET # BLD: 259 K/MM3 (ref 140–440)
RBC # BLD AUTO: 3.35 M/MM3 (ref 3.65–5.03)

## 2019-09-29 RX ADMIN — BUDESONIDE SCH MG: 0.5 INHALANT RESPIRATORY (INHALATION) at 07:31

## 2019-09-29 RX ADMIN — Medication SCH ML: at 09:36

## 2019-09-29 RX ADMIN — ACETAMINOPHEN PRN MG: 325 TABLET ORAL at 18:10

## 2019-09-29 RX ADMIN — Medication SCH ML: at 21:53

## 2019-09-29 RX ADMIN — ENOXAPARIN SODIUM SCH MG: 100 INJECTION SUBCUTANEOUS at 21:52

## 2019-09-29 RX ADMIN — NICOTINE SCH MG: 14 PATCH TRANSDERMAL at 09:36

## 2019-09-29 RX ADMIN — ARFORMOTEROL TARTRATE SCH MCG: 15 SOLUTION RESPIRATORY (INHALATION) at 07:30

## 2019-09-29 RX ADMIN — OXYCODONE AND ACETAMINOPHEN PRN TAB: 5; 325 TABLET ORAL at 05:34

## 2019-09-29 RX ADMIN — IPRATROPIUM BROMIDE AND ALBUTEROL SULFATE SCH: .5; 3 SOLUTION RESPIRATORY (INHALATION) at 07:31

## 2019-09-29 RX ADMIN — CEFTRIAXONE SODIUM SCH MLS/HR: 2 INJECTION, POWDER, FOR SOLUTION INTRAMUSCULAR; INTRAVENOUS at 22:45

## 2019-09-29 RX ADMIN — FAMOTIDINE SCH MG: 20 TABLET ORAL at 21:53

## 2019-09-29 RX ADMIN — IPRATROPIUM BROMIDE AND ALBUTEROL SULFATE SCH: .5; 3 SOLUTION RESPIRATORY (INHALATION) at 20:02

## 2019-09-29 RX ADMIN — ARFORMOTEROL TARTRATE SCH MCG: 15 SOLUTION RESPIRATORY (INHALATION) at 19:56

## 2019-09-29 RX ADMIN — IPRATROPIUM BROMIDE AND ALBUTEROL SULFATE SCH AMPUL: .5; 3 SOLUTION RESPIRATORY (INHALATION) at 03:13

## 2019-09-29 RX ADMIN — AZITHROMYCIN SCH MLS/HR: 500 INJECTION, POWDER, LYOPHILIZED, FOR SOLUTION INTRAVENOUS at 22:00

## 2019-09-29 RX ADMIN — IPRATROPIUM BROMIDE AND ALBUTEROL SULFATE SCH AMPUL: .5; 3 SOLUTION RESPIRATORY (INHALATION) at 13:34

## 2019-09-29 RX ADMIN — FAMOTIDINE SCH MG: 20 TABLET ORAL at 09:36

## 2019-09-29 RX ADMIN — BUDESONIDE SCH MG: 0.5 INHALANT RESPIRATORY (INHALATION) at 19:56

## 2019-09-29 RX ADMIN — Medication SCH ML: at 05:35

## 2019-09-29 NOTE — PROGRESS NOTE
Assessment and Plan


Assessment and plan: 





Sepsis


- Patient is being managed according to sepsis protocol with IV antibiotics and 

IV fluids


- Patient still had fever


- Blood cultures are negative so far


Bilateral pneumonia


- Continue IV antibiotics


COPD exacerbation; on nebulizer treatment, DuoNeb's and when necessary oxygen


Nicotine dependence; patient is on nicotine patch and counseled about cessation


DVT prophylaxis


-On Lovenox


Disposition


-Continue inpatient care








History


Interval history: 





Patient was seen and evaluated this morning, patient didn't have any new 

complaints. patient had fever of 100.2 last night. 





Hospitalist Physical





- Physical exam


Narrative exam: 


 Not in cardiopulmonary distress. 


 The patient appeared well nourished and normally developed.


 Vital signs as documented.


 Head exam is unremarkable.


 No scleral icterus .


 Neck is without jugular venous distension, thyromegaly, or carotid bruits. 


 Lungs rhonchi on the bibasilar areas.


Cardiac exam reveals regular rate and  Rhythm.


Abdominal exam reveals normal bowel sounds. 


Extremities are nonedematous and both femoral and pedal pulses are normal.


CNS: Alert and oriented 3.  No focal weakness.





- Constitutional


Vitals: 


                                        











Temp Pulse Resp BP Pulse Ox


 


 98.8 F   82   20   116/64   97 


 


 09/29/19 04:54  09/29/19 07:30  09/29/19 07:30  09/29/19 04:54  09/29/19 04:54














Results





- Labs


CBC & Chem 7: 


                                 09/29/19 06:02





                                 09/29/19 06:02


Labs: 


                             Laboratory Last Values











WBC  5.6 K/mm3 (4.5-11.0)   09/29/19  06:02    


 


RBC  3.35 M/mm3 (3.65-5.03)  L  09/29/19  06:02    


 


Hgb  9.6 gm/dl (10.1-14.3)  L  09/29/19  06:02    


 


Hct  28.7 % (30.3-42.9)  L  09/29/19  06:02    


 


MCV  86 fl (79-97)   09/29/19  06:02    


 


MCH  29 pg (28-32)   09/29/19  06:02    


 


MCHC  33 % (30-34)   09/29/19  06:02    


 


RDW  14.3 % (13.2-15.2)   09/29/19  06:02    


 


Plt Count  259 K/mm3 (140-440)   09/29/19  06:02    


 


Lymph % (Auto)  13.9 % (13.4-35.0)   09/29/19  06:02    


 


Mono % (Auto)  10.5 % (0.0-7.3)  H  09/29/19  06:02    


 


Eos % (Auto)  0.3 % (0.0-4.3)   09/29/19  06:02    


 


Baso % (Auto)  0.4 % (0.0-1.8)   09/29/19  06:02    


 


Lymph #  0.8 K/mm3 (1.2-5.4)  L  09/29/19  06:02    


 


Mono #  0.6 K/mm3 (0.0-0.8)   09/29/19  06:02    


 


Eos #  0.0 K/mm3 (0.0-0.4)   09/29/19  06:02    


 


Baso #  0.0 K/mm3 (0.0-0.1)   09/29/19  06:02    


 


Seg Neutrophils %  74.9 % (40.0-70.0)  H  09/29/19  06:02    


 


Seg Neutrophils #  4.2 K/mm3 (1.8-7.7)   09/29/19  06:02    


 


PT  14.0 Sec. (12.2-14.9)   09/27/19  11:01    


 


INR  1.11  (0.87-1.13)   09/27/19  11:01    


 


APTT  35.1 Sec. (24.2-36.6)   09/27/19  11:01    


 


Sodium  136 mmol/L (137-145)  L  09/29/19  06:02    


 


Potassium  3.5 mmol/L (3.6-5.0)  L  09/29/19  06:02    


 


Chloride  101.0 mmol/L ()   09/29/19  06:02    


 


Carbon Dioxide  19 mmol/L (22-30)  L  09/29/19  06:02    


 


  20 mmol/L  09/29/19  06:02    


 


BUN  6 mg/dL (7-17)  L  09/29/19  06:02    


 


  0.6 mg/dL (0.7-1.2)  L  09/29/19  06:02    


 


Estimated GFR  > 60 ml/min  09/29/19  06:02    


 


  10 %  09/29/19  06:02    


 


Glucose  98 mg/dL ()   09/29/19  06:02    


 


  5.0 % (4-6)   09/27/19  22:37    


 


Lactic Acid  1.40 mmol/L (0.7-2.0)   09/27/19  11:01    


 


Calcium  8.5 mg/dL (8.4-10.2)   09/29/19  06:02    


 


Magnesium  1.90 mg/dL (1.7-2.3)   09/27/19  11:01    


 


  0.20 mg/dL (0.1-1.2)   09/28/19  06:06    


 


  0.2 mg/dL (0-0.2)   09/27/19  11:01    


 


  0.4 mg/dL  09/27/19  11:01    


 


AST  44 units/L (5-40)  H  09/28/19  06:06    


 


ALT  12 units/L (7-56)   09/28/19  06:06    


 


  67 units/L ()   09/28/19  06:06    


 


  309 units/L ()  H  09/27/19  11:01    


 


CK-MB (CK-2)  4.9 ng/mL (0.0-4.0)  H  09/27/19  11:01    


 


CK-MB (CK-2) Rel Index  1.5  (0-4)   09/27/19  11:01    


 


  0.025 ng/mL (0.00-0.029)   09/27/19  11:01    


 


  8.4 g/dL (6.3-8.2)  H  09/28/19  06:06    


 


  3.1 g/dL (3.9-5)  L  09/28/19  06:06    


 


  0.6 %  09/28/19  06:06    


 


  Straw  (Yellow)   09/27/19  Unknown


 


  Slightly-cloudy  (Clear)   09/27/19  Unknown


 


  6.0  (5.0-7.0)   09/27/19  Unknown


 


Ur Specific Gravity  1.008  (1.003-1.030)   09/27/19  Unknown


 


  30 mg/dl mg/dL (Negative)   09/27/19  Unknown


 


  Neg mg/dL (Negative)   09/27/19  Unknown


 


  Neg mg/dL (Negative)   09/27/19  Unknown


 


  Mod  (Negative)   09/27/19  Unknown


 


  Neg  (Negative)   09/27/19  Unknown


 


  Neg  (Negative)   09/27/19  Unknown


 


  < 2.0 mg/dL (<2.0)   09/27/19  Unknown


 


Ur Leukocyte Esterase  Lg  (Negative)   09/27/19  Unknown


 


  46.0 /HPF (0.0-6.0)  H  09/27/19  Unknown


 


  34.0 /HPF (0.0-6.0)   09/27/19  Unknown


 


U Epithel Cells (Auto)  2.0 /HPF (0-13.0)   09/27/19  Unknown


 


  1+ /HPF (Negative)   09/27/19  Unknown


 


  Presumptive negative   09/27/19  Unknown


 


  Presumptive negative   09/27/19  Unknown


 


Ur Barbiturates Screen  Presumptive negative   09/27/19  Unknown


 


Ur Phencyclidine Scrn  Presumptive negative   09/27/19  Unknown


 


Ur Amphetamines Screen  Presumptive negative   09/27/19  Unknown


 


U Benzodiazepines Scrn  Presumptive negative   09/27/19  Unknown


 


  Presumptive positive   09/27/19  Unknown


 


U Marijuana (THC) Screen  Presumptive negative   09/27/19  Unknown


 


  Disclamer   09/27/19  Unknown














Active Medications





- Current Medications


Current Medications: 














Generic Name Dose Route Start Last Admin





  Trade Name Freq  PRN Reason Stop Dose Admin


 


Acetaminophen  650 mg  09/27/19 20:42  09/28/19 23:56





  Tylenol  PO   650 mg





  Q4H PRN   Administration





  Pain MILD(1-3)/Fever >100.5/HA  


 


Albuterol  2.5 mg  09/28/19 09:07 





  Proventil  IH  





  Q4HRT PRN  





  Shortness Of Breath  


 


Albuterol/Ipratropium  1 ampul  09/28/19 14:00  09/29/19 07:31





  Duoneb *Not For Prn Use*  IH   Not Given





  Q6HRT EMILIE  


 


Arformoterol Tartrate  15 mcg  09/28/19 20:00  09/29/19 07:30





  Brovana Nebu  IH   15 mcg





  Q12HRT EMILIE   Administration


 


Budesonide  0.5 mg  09/28/19 20:00  09/29/19 07:31





  Pulmicort  IH   0.5 mg





  Q12HRT EMILIE   Administration


 


Enoxaparin Sodium  40 mg  09/28/19 22:00  09/28/19 22:25





  Lovenox  SUB-Q   40 mg





  QDAY@2200 EMILIE   Administration


 


Famotidine  20 mg  09/27/19 22:00  09/29/19 09:36





  Pepcid  PO   20 mg





  BID EMILIE   Administration


 


Guaifenesin  600 mg  09/27/19 22:00  09/29/19 09:36





  Mucinex Er  PO   600 mg





  Q12HR EMILIE   Administration


 


Hydromorphone HCl  0.5 mg  09/27/19 20:43 





  Dilaudid  IV  





  Q3H PRN  





  Pain , Severe (7-10)  


 


Ceftriaxone Sodium  2 gm in 100 mls @ 200 mls/hr  09/27/19 23:00  09/29/19 05:32





  Rocephin/Ns 2 Gm/100 Ml  IV   Infused





  Q24H EMILIE   Infusion





  Protocol  


 


Azithromycin 500 mg/ Sodium  250 mls @ 250 mls/hr  09/27/19 22:00  09/29/19 

05:31





  Chloride  IV   Infused





  Q24H EMILIE   Infusion





  Protocol  


 


Nicotine  14 mg  09/28/19 10:00  09/29/19 09:36





  Habitrol  TD   14 mg





  QDAY EMILIE   Administration


 


Ondansetron HCl  4 mg  09/27/19 20:42  09/28/19 22:36





  Zofran  IV   4 mg





  Q8H PRN   Administration





  Nausea And Vomiting  


 


Oxycodone/Acetaminophen  1 tab  09/27/19 20:43  09/29/19 05:34





  Percocet 5/325  PO   1 tab





  Q6H PRN   Administration





  Pain, Moderate (4-6)  


 


Sodium Chloride  10 ml  09/27/19 22:00  09/29/19 09:36





  Sodium Chloride Flush Syringe 10 Ml  IV   10 ml





  BID EMILIE   Administration


 


Sodium Chloride  10 ml  09/27/19 20:42 





  Sodium Chloride Flush Syringe 10 Ml  IV  





  PRN PRN  





  LINE FLUSH

## 2019-09-30 RX ADMIN — FAMOTIDINE SCH MG: 20 TABLET ORAL at 10:32

## 2019-09-30 RX ADMIN — NICOTINE SCH MG: 14 PATCH TRANSDERMAL at 10:34

## 2019-09-30 RX ADMIN — IPRATROPIUM BROMIDE AND ALBUTEROL SULFATE SCH: .5; 3 SOLUTION RESPIRATORY (INHALATION) at 20:47

## 2019-09-30 RX ADMIN — IPRATROPIUM BROMIDE AND ALBUTEROL SULFATE SCH AMPUL: .5; 3 SOLUTION RESPIRATORY (INHALATION) at 08:12

## 2019-09-30 RX ADMIN — FAMOTIDINE SCH MG: 20 TABLET ORAL at 21:36

## 2019-09-30 RX ADMIN — IPRATROPIUM BROMIDE AND ALBUTEROL SULFATE SCH: .5; 3 SOLUTION RESPIRATORY (INHALATION) at 08:15

## 2019-09-30 RX ADMIN — Medication SCH ML: at 21:36

## 2019-09-30 RX ADMIN — BUDESONIDE SCH MG: 0.5 INHALANT RESPIRATORY (INHALATION) at 20:47

## 2019-09-30 RX ADMIN — ENOXAPARIN SODIUM SCH MG: 100 INJECTION SUBCUTANEOUS at 21:35

## 2019-09-30 RX ADMIN — ARFORMOTEROL TARTRATE SCH MCG: 15 SOLUTION RESPIRATORY (INHALATION) at 20:47

## 2019-09-30 RX ADMIN — IPRATROPIUM BROMIDE AND ALBUTEROL SULFATE SCH: .5; 3 SOLUTION RESPIRATORY (INHALATION) at 07:10

## 2019-09-30 RX ADMIN — IPRATROPIUM BROMIDE AND ALBUTEROL SULFATE SCH: .5; 3 SOLUTION RESPIRATORY (INHALATION) at 13:24

## 2019-09-30 RX ADMIN — Medication SCH ML: at 10:33

## 2019-09-30 RX ADMIN — CEFTRIAXONE SODIUM SCH MLS/HR: 2 INJECTION, POWDER, FOR SOLUTION INTRAMUSCULAR; INTRAVENOUS at 22:29

## 2019-09-30 RX ADMIN — ARFORMOTEROL TARTRATE SCH MCG: 15 SOLUTION RESPIRATORY (INHALATION) at 08:12

## 2019-09-30 RX ADMIN — AZITHROMYCIN SCH MLS/HR: 500 INJECTION, POWDER, LYOPHILIZED, FOR SOLUTION INTRAVENOUS at 21:36

## 2019-09-30 RX ADMIN — BUDESONIDE SCH MG: 0.5 INHALANT RESPIRATORY (INHALATION) at 08:11

## 2019-09-30 NOTE — PROGRESS NOTE
Assessment and Plan


Assessment and plan: 


58-year-old woman who presented to the hospital complaining of cough and right-

sided chest pain.  She was found to have sepsis due to pneumonia.  She received 

antibiotics and improved.


Preventative health counseling performed for 17 minutes


She was counseled about smoking cessation, she verbalized understanding


Tobacco abuse/dependence


Smoking cessation counseling performed for 10 minutes, nicotine patches when 

necessary


She still having fevers, monitor in hospital another day





Diagnosis


Sepsis


Pneumonia


COPD exacerbation


Nicotine dependence


Cocaine abuse











History


Interval history: 





Review of systems


Constitutional: Still having fevers





CVS: No chest pain, no orthopnea,  no pedal edema





GI: No abdominal pain, no diarrhea, no vomiting, no constipation





Respiratory: Still having productive cough of yellow sputum.





Hospitalist Physical





- Physical exam


Narrative exam: 





General.: Appears well, no distress, nontoxic





HEENT: Moist mucous membranes, extraocular muscles intact, no lymphadenopathy





Neck: supple





Cardiac: S1-S2 heard





Lungs: Crackles in right lung





Abdomen: soft , nontender,  nondistended,  bowel sounds positive





Extremities: no edema clubbing or cyanosis





Skin: no rash or lesions





Neurologic: no gross focal deficits


Psych: calm, and cooperative 

















- Constitutional


Vitals: 


                                        











Temp Pulse Resp BP Pulse Ox


 


 98.4 F   88   19   160/82   99 


 


 09/30/19 03:54  09/30/19 07:00  09/30/19 07:00  09/30/19 03:54  09/30/19 03:54














Results





- Labs


CBC & Chem 7: 


                                 09/29/19 06:02





                                 09/29/19 06:02


Labs: 


                             Laboratory Last Values











WBC  5.6 K/mm3 (4.5-11.0)   09/29/19  06:02    


 


RBC  3.35 M/mm3 (3.65-5.03)  L  09/29/19  06:02    


 


Hgb  9.6 gm/dl (10.1-14.3)  L  09/29/19  06:02    


 


Hct  28.7 % (30.3-42.9)  L  09/29/19  06:02    


 


MCV  86 fl (79-97)   09/29/19  06:02    


 


MCH  29 pg (28-32)   09/29/19  06:02    


 


MCHC  33 % (30-34)   09/29/19  06:02    


 


RDW  14.3 % (13.2-15.2)   09/29/19  06:02    


 


Plt Count  259 K/mm3 (140-440)   09/29/19  06:02    


 


Lymph % (Auto)  13.9 % (13.4-35.0)   09/29/19  06:02    


 


Mono % (Auto)  10.5 % (0.0-7.3)  H  09/29/19  06:02    


 


Eos % (Auto)  0.3 % (0.0-4.3)   09/29/19  06:02    


 


Baso % (Auto)  0.4 % (0.0-1.8)   09/29/19  06:02    


 


Lymph #  0.8 K/mm3 (1.2-5.4)  L  09/29/19  06:02    


 


Mono #  0.6 K/mm3 (0.0-0.8)   09/29/19  06:02    


 


Eos #  0.0 K/mm3 (0.0-0.4)   09/29/19  06:02    


 


Baso #  0.0 K/mm3 (0.0-0.1)   09/29/19  06:02    


 


Seg Neutrophils %  74.9 % (40.0-70.0)  H  09/29/19  06:02    


 


Seg Neutrophils #  4.2 K/mm3 (1.8-7.7)   09/29/19  06:02    


 


PT  14.0 Sec. (12.2-14.9)   09/27/19  11:01    


 


INR  1.11  (0.87-1.13)   09/27/19  11:01    


 


APTT  35.1 Sec. (24.2-36.6)   09/27/19  11:01    


 


Sodium  136 mmol/L (137-145)  L  09/29/19  06:02    


 


Potassium  3.5 mmol/L (3.6-5.0)  L  09/29/19  06:02    


 


Chloride  101.0 mmol/L ()   09/29/19  06:02    


 


Carbon Dioxide  19 mmol/L (22-30)  L  09/29/19  06:02    


 


Anion Gap  20 mmol/L  09/29/19  06:02    


 


BUN  6 mg/dL (7-17)  L  09/29/19  06:02    


 


Creatinine  0.6 mg/dL (0.7-1.2)  L  09/29/19  06:02    


 


Estimated GFR  > 60 ml/min  09/29/19  06:02    


 


BUN/Creatinine Ratio  10 %  09/29/19  06:02    


 


Glucose  98 mg/dL ()   09/29/19  06:02    


 


Hemoglobin A1c  5.0 % (4-6)   09/27/19  22:37    


 


Lactic Acid  1.40 mmol/L (0.7-2.0)   09/27/19  11:01    


 


Calcium  8.5 mg/dL (8.4-10.2)   09/29/19  06:02    


 


Magnesium  1.90 mg/dL (1.7-2.3)   09/27/19  11:01    


 


Total Bilirubin  0.20 mg/dL (0.1-1.2)   09/28/19  06:06    


 


Direct Bilirubin  0.2 mg/dL (0-0.2)   09/27/19  11:01    


 


Indirect Bilirubin  0.4 mg/dL  09/27/19  11:01    


 


AST  44 units/L (5-40)  H  09/28/19  06:06    


 


ALT  12 units/L (7-56)   09/28/19  06:06    


 


Alkaline Phosphatase  67 units/L ()   09/28/19  06:06    


 


Total Creatine Kinase  309 units/L ()  H  09/27/19  11:01    


 


CK-MB (CK-2)  4.9 ng/mL (0.0-4.0)  H  09/27/19  11:01    


 


CK-MB (CK-2) Rel Index  1.5  (0-4)   09/27/19  11:01    


 


Troponin T  0.025 ng/mL (0.00-0.029)   09/27/19  11:01    


 


Total Protein  8.4 g/dL (6.3-8.2)  H  09/28/19  06:06    


 


Albumin  3.1 g/dL (3.9-5)  L  09/28/19  06:06    


 


Albumin/Globulin Ratio  0.6 %  09/28/19  06:06    


 


Urine Color  Straw  (Yellow)   09/27/19  Unknown


 


Urine Turbidity  Slightly-cloudy  (Clear)   09/27/19  Unknown


 


Urine pH  6.0  (5.0-7.0)   09/27/19  Unknown


 


Ur Specific Gravity  1.008  (1.003-1.030)   09/27/19  Unknown


 


Urine Protein  30 mg/dl mg/dL (Negative)   09/27/19  Unknown


 


Urine Glucose (UA)  Neg mg/dL (Negative)   09/27/19  Unknown


 


Urine Ketones  Neg mg/dL (Negative)   09/27/19  Unknown


 


Urine Blood  Mod  (Negative)   09/27/19  Unknown


 


Urine Nitrite  Neg  (Negative)   09/27/19  Unknown


 


Urine Bilirubin  Neg  (Negative)   09/27/19  Unknown


 


Urine Urobilinogen  < 2.0 mg/dL (<2.0)   09/27/19  Unknown


 


Ur Leukocyte Esterase  Lg  (Negative)   09/27/19  Unknown


 


Urine WBC (Auto)  46.0 /HPF (0.0-6.0)  H  09/27/19  Unknown


 


Urine RBC (Auto)  34.0 /HPF (0.0-6.0)   09/27/19  Unknown


 


U Epithel Cells (Auto)  2.0 /HPF (0-13.0)   09/27/19  Unknown


 


Urine Bacteria (Auto)  1+ /HPF (Negative)   09/27/19  Unknown


 


Urine Opiates Screen  Presumptive negative   09/27/19  Unknown


 


Urine Methadone Screen  Presumptive negative   09/27/19  Unknown


 


Ur Barbiturates Screen  Presumptive negative   09/27/19  Unknown


 


Ur Phencyclidine Scrn  Presumptive negative   09/27/19  Unknown


 


Ur Amphetamines Screen  Presumptive negative   09/27/19  Unknown


 


U Benzodiazepines Scrn  Presumptive negative   09/27/19  Unknown


 


Urine Cocaine Screen  Presumptive positive   09/27/19  Unknown


 


U Marijuana (THC) Screen  Presumptive negative   09/27/19  Unknown


 


Drugs of Abuse Note  Disclamer   09/27/19  Unknown














Active Medications





- Current Medications


Current Medications: 














Generic Name Dose Route Start Last Admin





  Trade Name Freq  PRN Reason Stop Dose Admin


 


Acetaminophen  650 mg  09/27/19 20:42  09/29/19 18:10





  Tylenol  PO   650 mg





  Q4H PRN   Administration





  Pain MILD(1-3)/Fever >100.5/HA  


 


Albuterol  2.5 mg  09/28/19 09:07 





  Proventil  IH  





  Q4HRT PRN  





  Shortness Of Breath  


 


Albuterol/Ipratropium  1 ampul  09/28/19 14:00  09/30/19 08:15





  Duoneb *Not For Prn Use*  IH   Not Given





  Q6HRT EMILIE  


 


Arformoterol Tartrate  15 mcg  09/28/19 20:00  09/30/19 08:12





  Brovana Nebu  IH   15 mcg





  Q12HRT EMILIE   Administration


 


Budesonide  0.5 mg  09/28/19 20:00  09/30/19 08:11





  Pulmicort  IH   0.5 mg





  Q12HRT EMILIE   Administration


 


Enoxaparin Sodium  40 mg  09/28/19 22:00  09/29/19 21:52





  Lovenox  SUB-Q   40 mg





  QDAY@2200 EMILIE   Administration


 


Famotidine  20 mg  09/27/19 22:00  09/30/19 10:32





  Pepcid  PO   20 mg





  BID EMILIE   Administration


 


Guaifenesin  600 mg  09/27/19 22:00  09/30/19 10:32





  Mucinex Er  PO   600 mg





  Q12HR EMILIE   Administration


 


Hydromorphone HCl  0.5 mg  09/27/19 20:43 





  Dilaudid  IV  





  Q3H PRN  





  Pain , Severe (7-10)  


 


Ceftriaxone Sodium  2 gm in 100 mls @ 200 mls/hr  09/27/19 23:00  09/29/19 22:45





  Rocephin/Ns 2 Gm/100 Ml  IV   200 mls/hr





  Q24H EMILIE   Administration





  Protocol  


 


Azithromycin 500 mg/ Sodium  250 mls @ 250 mls/hr  09/27/19 22:00  09/29/19 

22:00





  Chloride  IV   250 mls/hr





  Q24H [facility]   Administration





  Protocol  


 


Nicotine  14 mg  09/28/19 10:00  09/30/19 10:34





  Habitrol  TD   14 mg





  QDAY EMILIE   Administration


 


Ondansetron HCl  4 mg  09/27/19 20:42  09/28/19 22:36





  Zofran  IV   4 mg





  Q8H PRN   Administration





  Nausea And Vomiting  


 


Oxycodone/Acetaminophen  1 tab  09/27/19 20:43  09/29/19 05:34





  Percocet 5/325  PO   1 tab





  Q6H PRN   Administration





  Pain, Moderate (4-6)  


 


Sodium Chloride  10 ml  09/27/19 22:00  09/30/19 10:33





  Sodium Chloride Flush Syringe 10 Ml  IV   10 ml





  BID EMILIE   Administration


 


Sodium Chloride  10 ml  09/27/19 20:42 





  Sodium Chloride Flush Syringe 10 Ml  IV  





  PRN PRN  





  LINE FLUSH

## 2019-10-01 VITALS — SYSTOLIC BLOOD PRESSURE: 124 MMHG | DIASTOLIC BLOOD PRESSURE: 71 MMHG

## 2019-10-01 RX ADMIN — Medication SCH ML: at 10:20

## 2019-10-01 RX ADMIN — IPRATROPIUM BROMIDE AND ALBUTEROL SULFATE SCH AMPUL: .5; 3 SOLUTION RESPIRATORY (INHALATION) at 07:38

## 2019-10-01 RX ADMIN — FAMOTIDINE SCH MG: 20 TABLET ORAL at 10:20

## 2019-10-01 RX ADMIN — OXYCODONE AND ACETAMINOPHEN PRN TAB: 5; 325 TABLET ORAL at 10:32

## 2019-10-01 RX ADMIN — IPRATROPIUM BROMIDE AND ALBUTEROL SULFATE SCH: .5; 3 SOLUTION RESPIRATORY (INHALATION) at 02:43

## 2019-10-01 RX ADMIN — ARFORMOTEROL TARTRATE SCH MCG: 15 SOLUTION RESPIRATORY (INHALATION) at 07:38

## 2019-10-01 RX ADMIN — NICOTINE SCH MG: 14 PATCH TRANSDERMAL at 10:19

## 2019-10-01 RX ADMIN — BUDESONIDE SCH MG: 0.5 INHALANT RESPIRATORY (INHALATION) at 07:38

## 2019-10-01 NOTE — DISCHARGE SUMMARY
Providers





- Providers


Date of Admission: 


09/27/19 12:21





Attending physician: 


SUZY AGOSTO MD





Primary care physician: 


PRIMARY CARE MD








Hospitalization


Condition: Stable


Hospital course: 


58-year-old woman who presented to the hospital complaining of cough and right-

sided chest pain.  She was found to have sepsis due to pneumonia.  She received 

antibiotics and improved.


-Preventative health counseling performed for 17 minutes


-She was counseled about smoking cessation, she verbalized understanding


-Tobacco abuse/dependence


-Smoking cessation counseling performed for 10 minutes, nicotine patches when 

necessary


-She still having fevers, monitor in hospital another day





Diagnosis


Sepsis


Pneumonia


COPD exacerbation


Nicotine dependence


Cocaine abuse





Disposition: DC-01 TO HOME OR SELFCARE


Time spent for discharge: 33 mins





Core Measure Documentation





- Palliative Care


Palliative Care/ Comfort Measures: Not Applicable





- Core Measures


Any of the following diagnoses?: none





Exam





- Constitutional


Vitals: 


                                        











Temp Pulse Resp BP Pulse Ox


 


 98.3 F   80   18   162/79   99 


 


 10/01/19 05:18  10/01/19 07:38  10/01/19 07:38  10/01/19 05:18  10/01/19 11:13











General appearance: Present: no acute distress, well-nourished





- EENT


Eyes: Present: PERRL


ENT: hearing intact, clear oral mucosa





- Neck


Neck: Present: supple, normal ROM





- Respiratory


Respiratory effort: normal


Respiratory: bilateral: CTA





- Cardiovascular


Heart Sounds: Present: S1 & S2.  Absent: rub, click





- Extremities


Extremities: pulses symmetrical, No edema


Peripheral Pulses: within normal limits





- Abdominal


General gastrointestinal: Present: soft, non-tender, non-distended, normal bowel

sounds


Female genitourinary: Present: normal





- Integumentary


Integumentary: Present: clear, warm, dry





- Musculoskeletal


Musculoskeletal: gait normal, strength equal bilaterally





- Psychiatric


Psychiatric: appropriate mood/affect, intact judgment & insight





- Neurologic


Neurologic: CNII-XII intact, moves all extremities





Plan


Follow up with: 


PRIMARY CARE,MD [Primary Care Provider] - 3-5 Days


Prescriptions: 


Amoxicillin/Potassium Clav [Augmentin 875-125 Tablet] 1 each PO BID #6 tablet


Nicotine [Habitrol] 14 mg TD QDAY #30 patch


ALBUTEROL Inhaler (OR & NICU) [ProAir HFA Inhaler] 2 puff IH QID PRN #1 

inhalation


 PRN Reason: Shortness Of Breath

## 2020-06-26 ENCOUNTER — HOSPITAL ENCOUNTER (INPATIENT)
Dept: HOSPITAL 5 - ED | Age: 60
LOS: 2 days | Discharge: HOME | DRG: 247 | End: 2020-06-28
Attending: INTERNAL MEDICINE | Admitting: INTERNAL MEDICINE
Payer: MEDICAID

## 2020-06-26 DIAGNOSIS — I25.10: ICD-10-CM

## 2020-06-26 DIAGNOSIS — Z71.6: ICD-10-CM

## 2020-06-26 DIAGNOSIS — F10.20: ICD-10-CM

## 2020-06-26 DIAGNOSIS — I21.19: Primary | ICD-10-CM

## 2020-06-26 DIAGNOSIS — M19.90: ICD-10-CM

## 2020-06-26 DIAGNOSIS — Z79.899: ICD-10-CM

## 2020-06-26 DIAGNOSIS — Z95.5: ICD-10-CM

## 2020-06-26 DIAGNOSIS — I10: ICD-10-CM

## 2020-06-26 DIAGNOSIS — F17.200: ICD-10-CM

## 2020-06-26 DIAGNOSIS — K76.1: ICD-10-CM

## 2020-06-26 DIAGNOSIS — D64.9: ICD-10-CM

## 2020-06-26 DIAGNOSIS — J45.909: ICD-10-CM

## 2020-06-26 LAB
APTT BLD: 23.9 SEC. (ref 24.2–36.6)
BASOPHILS # (AUTO): 0 K/MM3 (ref 0–0.1)
BASOPHILS # (AUTO): 0 K/MM3 (ref 0–0.1)
BASOPHILS NFR BLD AUTO: 0.4 % (ref 0–1.8)
BASOPHILS NFR BLD AUTO: 0.8 % (ref 0–1.8)
BUN SERPL-MCNC: 10 MG/DL (ref 7–17)
BUN SERPL-MCNC: 9 MG/DL (ref 7–17)
BUN/CREAT SERPL: 13 %
BUN/CREAT SERPL: 13 %
CALCIUM SERPL-MCNC: 8.5 MG/DL (ref 8.4–10.2)
CALCIUM SERPL-MCNC: 9.5 MG/DL (ref 8.4–10.2)
EOSINOPHIL # BLD AUTO: 0 K/MM3 (ref 0–0.4)
EOSINOPHIL # BLD AUTO: 0 K/MM3 (ref 0–0.4)
EOSINOPHIL NFR BLD AUTO: 0.1 % (ref 0–4.3)
EOSINOPHIL NFR BLD AUTO: 0.7 % (ref 0–4.3)
HCT VFR BLD CALC: 34.4 % (ref 30.3–42.9)
HCT VFR BLD CALC: 37 % (ref 30.3–42.9)
HDLC SERPL-MCNC: 81 MG/DL (ref 40–59)
HEMOLYSIS INDEX: 15
HEMOLYSIS INDEX: 28
HGB BLD-MCNC: 11.1 GM/DL (ref 10.1–14.3)
HGB BLD-MCNC: 12.2 GM/DL (ref 10.1–14.3)
INR PPP: 1.07 (ref 0.87–1.13)
LYMPHOCYTES # BLD AUTO: 0.9 K/MM3 (ref 1.2–5.4)
LYMPHOCYTES # BLD AUTO: 1.1 K/MM3 (ref 1.2–5.4)
LYMPHOCYTES NFR BLD AUTO: 19.5 % (ref 13.4–35)
LYMPHOCYTES NFR BLD AUTO: 21.6 % (ref 13.4–35)
MCHC RBC AUTO-ENTMCNC: 32 % (ref 30–34)
MCHC RBC AUTO-ENTMCNC: 33 % (ref 30–34)
MCV RBC AUTO: 88 FL (ref 79–97)
MCV RBC AUTO: 89 FL (ref 79–97)
MONOCYTES # (AUTO): 0.3 K/MM3 (ref 0–0.8)
MONOCYTES # (AUTO): 0.4 K/MM3 (ref 0–0.8)
MONOCYTES % (AUTO): 4.6 % (ref 0–7.3)
MONOCYTES % (AUTO): 9.4 % (ref 0–7.3)
PLATELET # BLD: 235 K/MM3 (ref 140–440)
PLATELET # BLD: 307 K/MM3 (ref 140–440)
RBC # BLD AUTO: 3.89 M/MM3 (ref 3.65–5.03)
RBC # BLD AUTO: 4.16 M/MM3 (ref 3.65–5.03)

## 2020-06-26 PROCEDURE — 84484 ASSAY OF TROPONIN QUANT: CPT

## 2020-06-26 PROCEDURE — 93306 TTE W/DOPPLER COMPLETE: CPT

## 2020-06-26 PROCEDURE — 027035Z DILATION OF CORONARY ARTERY, ONE ARTERY WITH TWO DRUG-ELUTING INTRALUMINAL DEVICES, PERCUTANEOUS APPROACH: ICD-10-PCS | Performed by: INTERNAL MEDICINE

## 2020-06-26 PROCEDURE — 4A023N7 MEASUREMENT OF CARDIAC SAMPLING AND PRESSURE, LEFT HEART, PERCUTANEOUS APPROACH: ICD-10-PCS | Performed by: INTERNAL MEDICINE

## 2020-06-26 PROCEDURE — 85610 PROTHROMBIN TIME: CPT

## 2020-06-26 PROCEDURE — B2151ZZ FLUOROSCOPY OF LEFT HEART USING LOW OSMOLAR CONTRAST: ICD-10-PCS | Performed by: INTERNAL MEDICINE

## 2020-06-26 PROCEDURE — C1894 INTRO/SHEATH, NON-LASER: HCPCS

## 2020-06-26 PROCEDURE — 99406 BEHAV CHNG SMOKING 3-10 MIN: CPT

## 2020-06-26 PROCEDURE — 82553 CREATINE MB FRACTION: CPT

## 2020-06-26 PROCEDURE — C1887 CATHETER, GUIDING: HCPCS

## 2020-06-26 PROCEDURE — 36415 COLL VENOUS BLD VENIPUNCTURE: CPT

## 2020-06-26 PROCEDURE — C9606 PERC D-E COR REVASC W AMI S: HCPCS

## 2020-06-26 PROCEDURE — 80061 LIPID PANEL: CPT

## 2020-06-26 PROCEDURE — 96374 THER/PROPH/DIAG INJ IV PUSH: CPT

## 2020-06-26 PROCEDURE — 85347 COAGULATION TIME ACTIVATED: CPT

## 2020-06-26 PROCEDURE — C1753 CATH, INTRAVAS ULTRASOUND: HCPCS

## 2020-06-26 PROCEDURE — 85018 HEMOGLOBIN: CPT

## 2020-06-26 PROCEDURE — 80048 BASIC METABOLIC PNL TOTAL CA: CPT

## 2020-06-26 PROCEDURE — C1769 GUIDE WIRE: HCPCS

## 2020-06-26 PROCEDURE — 92941 PRQ TRLML REVSC TOT OCCL AMI: CPT

## 2020-06-26 PROCEDURE — 82962 GLUCOSE BLOOD TEST: CPT

## 2020-06-26 PROCEDURE — 92978 ENDOLUMINL IVUS OCT C 1ST: CPT

## 2020-06-26 PROCEDURE — C1725 CATH, TRANSLUMIN NON-LASER: HCPCS

## 2020-06-26 PROCEDURE — C1760 CLOSURE DEV, VASC: HCPCS

## 2020-06-26 PROCEDURE — B240ZZ3 ULTRASONOGRAPHY OF SINGLE CORONARY ARTERY, INTRAVASCULAR: ICD-10-PCS | Performed by: INTERNAL MEDICINE

## 2020-06-26 PROCEDURE — 85049 AUTOMATED PLATELET COUNT: CPT

## 2020-06-26 PROCEDURE — 85025 COMPLETE CBC W/AUTO DIFF WBC: CPT

## 2020-06-26 PROCEDURE — B2111ZZ FLUOROSCOPY OF MULTIPLE CORONARY ARTERIES USING LOW OSMOLAR CONTRAST: ICD-10-PCS | Performed by: INTERNAL MEDICINE

## 2020-06-26 PROCEDURE — 82550 ASSAY OF CK (CPK): CPT

## 2020-06-26 PROCEDURE — 93458 L HRT ARTERY/VENTRICLE ANGIO: CPT

## 2020-06-26 PROCEDURE — 85730 THROMBOPLASTIN TIME PARTIAL: CPT

## 2020-06-26 PROCEDURE — C1874 STENT, COATED/COV W/DEL SYS: HCPCS

## 2020-06-26 PROCEDURE — 71045 X-RAY EXAM CHEST 1 VIEW: CPT

## 2020-06-26 PROCEDURE — 93005 ELECTROCARDIOGRAM TRACING: CPT

## 2020-06-26 PROCEDURE — 85014 HEMATOCRIT: CPT

## 2020-06-26 RX ADMIN — MIDAZOLAM ONE MG: 1 INJECTION INTRAMUSCULAR; INTRAVENOUS at 08:34

## 2020-06-26 RX ADMIN — Medication SCH ML: at 22:25

## 2020-06-26 RX ADMIN — SENNOSIDES SCH: 8.6 TABLET, FILM COATED ORAL at 18:49

## 2020-06-26 RX ADMIN — MORPHINE SULFATE PRN MG: 2 INJECTION, SOLUTION INTRAMUSCULAR; INTRAVENOUS at 22:49

## 2020-06-26 RX ADMIN — METOPROLOL TARTRATE SCH MG: 50 TABLET, FILM COATED ORAL at 22:24

## 2020-06-26 RX ADMIN — MIDAZOLAM ONE MG: 1 INJECTION INTRAMUSCULAR; INTRAVENOUS at 08:50

## 2020-06-26 RX ADMIN — FENTANYL CITRATE ONE MCG: 50 INJECTION, SOLUTION INTRAMUSCULAR; INTRAVENOUS at 08:34

## 2020-06-26 RX ADMIN — MORPHINE SULFATE PRN MG: 2 INJECTION, SOLUTION INTRAMUSCULAR; INTRAVENOUS at 18:41

## 2020-06-26 RX ADMIN — MORPHINE SULFATE PRN MG: 2 INJECTION, SOLUTION INTRAMUSCULAR; INTRAVENOUS at 12:14

## 2020-06-26 RX ADMIN — FENTANYL CITRATE ONE MCG: 50 INJECTION, SOLUTION INTRAMUSCULAR; INTRAVENOUS at 08:50

## 2020-06-26 RX ADMIN — LIDOCAINE HYDROCHLORIDE ONE ML: 20 INJECTION, SOLUTION INFILTRATION; PERINEURAL at 08:27

## 2020-06-26 RX ADMIN — SENNOSIDES SCH MG: 8.6 TABLET, FILM COATED ORAL at 22:23

## 2020-06-26 RX ADMIN — LIDOCAINE HYDROCHLORIDE ONE ML: 20 INJECTION, SOLUTION INFILTRATION; PERINEURAL at 08:34

## 2020-06-26 NOTE — EVENT NOTE
Date: 06/26/20


Detailed cardiology consultation dictated. 





Pt presented with acute inferior STEMI, s/p LHC this AM via groin approach with 

PCI to mid RCA - see dictated cath report. Initiate DAPT with [id number] and Plavix,

initiate lipitor and lopressor. Obtain echo. Will follow. 





RASHEED HOOD NP / DR. CAST

## 2020-06-26 NOTE — CONSULTATION
CARDIOLOGY CONSULTATION



HISTORY OF PRESENT ILLNESS:  The patient is a 59-year-old -American

female who woke up from sleep this morning with anterior chest pain and back

pain associated with shortness of breath.  Paramedics were called and EKG showed

mild ST elevations in the inferior leads along with mild ST depressions in I and

aVL suggestive of inferior injury.  A STEMI protocol was followed.  Please see

the catheterization report for further details.



PAST MEDICAL HISTORY:  Significant for history of arthritis and history of

cerebral aneurysm in the past in 2005.  Also, she says she had some surgery

done, but that  was long time ago.  Does not have any regular physician, does

not take any medication at this time.  Denies any history of hypertension or

diabetes mellitus.



SOCIAL HISTORY:  She smokes half pack to 1 pack per day, drinks alcohol

occasionally.  No drug abuse.



REVIEW OF SYSTEMS:  The patient denies any previous cardiac history except for

the chest pain this morning.  She denies any chest pain prior to this.  No

history of bronchial asthma or pulmonary emboli or DVT.  As mentioned above, has

history of cerebral aneurysm, but no history of stroke.  No history of peptic

ulcer disease or gastritis or acid reflux.  No history of hematuria or kidney

stones.  No history of unusual headaches.  No history of visual symptoms.  No

leg swelling.  No exertional chest pain or shortness of breath or palpitations. 

Denies any fever, coughing or chills.



PHYSICAL EXAMINATION:

VITAL SIGNS:  Blood pressure is elevated up to 160/90, pulse is 80 per minute. 

As mentioned above, acute ST elevations noted in the inferior leads.

HEENT:  Conjunctivae pink.  Sclerae anicteric.

NECK:  Supple, no JVD.

HEART:  Regular, probably S4, no S3, no significant murmurs.

LUNGS:  Clear.

ABDOMEN:  Benign.

EXTREMITIES:  Without edema.

NEUROLOGIC:  Alert, oriented x3.



Acute inferior wall and inferior wall myocardial infarction with chest pain of

few hours duration.  Because of persistent chest pain and acute injury changes

on the EKG, the patient was taken to the catheterization laboratory and

intervention of the occluded right coronary artery was performed with

drug-eluting stents.  Right femoral artery was used for access.  Also, because

of poor vein access, right femoral vein was used to obtain IV access.  The

patient tolerated the procedure well.  Good result was obtained.  At the end of

the procedure, the patient's chest pain improved and EKG changes improved.



FINAL IMPRESSION:

1.  Acute inferior wall myocardial infarction, status post emergency

intervention with good result and resolution of symptoms with excellent left

ventricular function.  This appears to be chronic at least subtotal occlusion

with collaterals noted on left coronary artery.  Left ventricular function is

more or less well preserved.

2.  History of chronic smoking.

3.  History of cerebral aneurysm, status post therapy.  Exact details not clear.

4.  History of arthritis.



The patient is on no medication prior to coming to the hospital. 

Post-procedure, the patient is hemodynamically stable, chest pain free, showing

sinus rhythm.





DD: 06/26/2020 10:33

DT: 06/26/2020 14:25

JOB# [id number]  [id number]

OWEN/JAN CASTELLANOS

## 2020-06-26 NOTE — CARDIAC CATHERIZATION REPORT
CARDIAC CATHETERIZATION AND CORONARY INTERVENTION REPORT



CLINICAL INFORMATION:  The patient is a 59-year-old -American female with

no significant past medical history of hypertension or diabetes.  She has a

history of chronic smoking.  Also, question of brain aneurysm, details are not

clear.  She had brain surgery done in 2005 or so.  Denies any previous cardiac

history.  No chest pains recently.  This patient is on no medication.  The

patient this morning woke up at 6:00 in the morning with chest pain and

paramedics were called and EKG showed mild ST elevations in the inferior leads

and reciprocal ST depressions in I and aVL.  Because of persistent chest pain

and EKG changes, the patient was brought to the catheterization laboratory on an

emergency basis as a part of the STEMI protocol.



DESCRIPTION OF PROCEDURE:  When she came to the catheterization laboratory from

the Emergency Room, there is no IV access in the hands.  Hence, it was felt that

right femoral vein can be obtained.  The patient was explained of the procedure.

 The patient is having persistent pain.  After evaluation for moderate sedation,

she received IV Versed and fentanyl.  Local anesthesia was given.  The patient

received IV Versed and fentanyl after obtaining the access in the groin.  Local

anesthesia was given in the right groin and initially right femoral vein access

was obtained percutaneously using 4-Panamanian micropuncture needle.  A 5-Panamanian

sheath was introduced and inserted for IV access.  Subsequently, right femoral

artery puncture was made using a 4-[place]amanian micropuncture needle.  A 6-[place]amanian

sheath was introduced.  Angiograms of the left ventricle were obtained using

multipurpose catheter in TAPIA and BIANCA projection.  This is followed by obtaining

the angiograms of the left coronary artery using JL3.5 catheter.  JR4 6-[place]amanian

guiding catheter was used to obtain the right coronary angiograms.  After

obtaining the angiograms of the right coronary artery, procedure was converted

into interventional procedure.  Cardiac catheterization findings are as follows:



HEMODYNAMICS:

1.  Opening aortic pressure 140/80.  Left ventricular pressure 146/29.  No

gradient across the aortic valve.  Estimated ejection fraction 65%.

2.  Left ventriculogram done in TAPIA projection showed very small area of mild

hypokinesis in the mid inferior wall.  Otherwise, rest of the ventricle is

hyperkinetic.  Mitral regurgitation could not be evaluated because of limited

amount of dye injected.

3.  Right coronary artery shows diffuse calcifications in the proximal, mid, and

distal areas.  Angiograms of the RCA showed total occlusion immediately after

the origin of the right ventricular branch.  Distal vessel is barely visualized

antegradely.  However, distal vessel was visualized on injection of the left

coronary artery.

4.  Left coronary artery showed diffuse mild calcifications in the LAD and

circumflex area.  Left main is without significant disease.  LAD shows mild

smooth irregularities, curves around the apex and no significant disease in the

diagonal branches.  Similarly, a circumflex artery and nondominant vessel showed

minimal irregularities.

5.  Collaterals as mentioned above to the distal RCA to the PDA on LCA

injection.



FINAL IMPRESSION:  Occluded mid RCA with faint collaterals to the distal part on

LCA injection with diffuse calcifications of the proximal, mid, and distal RCA. 

Left coronary system showing only mild disease, only small area of mild

hypokinesis noted in the mid inferior wall.



Considering persistent chest pains and EKG changes, this was converted into

interventional procedure.  Coronary intervention of the mid RCA.  The patient

presented with acute STEMI.  Angiogram showed total occlusion in the mid part. 

Diffuse calcifications noted.  The patient was given 6000 units of IV heparin as

anticoagulant.  The patient has indwelling 6-[place]amanian guiding catheter in place. 

This engaged the right coronary artery.  Initially, a [place] wire was used to

advance into the distal RCA, however, it is getting into the right branch.  This

catheter was left in the branch and 0.014-inch extra support Choice PT wire was

used and advanced into the distal RCA without much difficulty.  Lesion was

initially dilated with a 2.7 x 15 mm balloon few times.  After passing the

guidewire, there was a CORNELL 3 flow noted.  Initial CORNELL flow was 0. 

Subsequently, after inflating the lesion with the balloon few times, attempt was

made to insert a 3.0 x 18 mm Resolute Krish stent.  However, it could not be

advanced into the distal part.  Hence, the lesion was dilated again with

previous balloon in that area and the above-mentioned stent was reintroduced and

dilated up to 16 atmospheres with good result.  This resulted in some haziness

proximal to the stent.  Hence, a  3.0 x 12 mm Resolute Krish stent was inflated

up to 18 atmospheres with good result.  After obtaining satisfactory

angiographic result, [place] intravascular ultrasound was prepared in a

standard fashion and ACT was found to be 289 seconds.  Intravascular ultrasound

catheter was advanced from proximal to distal area without difficulty. 

Intravascular ultrasound showed good apposition of the stent throughout the

entire area.  Proximal and distal vessels were approximately 3.5-4 mm, however,

there is significant calcification up to 180-degree arch, was noted proximal and

distal to the stent and also in stented area.  Stented area showed good

expansion up to 3.0 mm with no evidence of thrombus or dissection.



Final angiogram showed no evidence of complications of embolization, dissection

or perforation.  CORNELL 0 flow was converted to CORNELL 3.  EKG changes improved by

the end of the procedure.  The patient's chest pain improved.  The patient did

have transient bradycardia, transient slow ventricular tachycardia after opening

the vessel, but these resolved spontaneously without any treatment.  The

patient's vital signs have been stable at the end of the procedure.



Angiograms of the right femoral artery were obtained at the end and it was felt

appropriate to apply a closing device.  A 6-[place]amanian ProGlide Perclose device was

prepared in a standard fashion and was applied to the right femoral artery

access site in a standard fashion without any difficulty.  Good result was

obtained.  Only minimal ooze was noted and pressure was applied for a few

minutes and complete hemostasis was obtained.  No hematoma noted.  The patient

was chest pain free at the end of the procedure.



FINAL IMPRESSION:  Uncomplicated drug-eluting stents placement in the mid and

distal RCA with very good result.  No complications noted.



Patient was monitored throughout procedure for any complications of 
sedation.Patient's moderate sedation started at 08:25 AM and monitoring ended at
09:44 AM,no untoward complications were noted at end of procedure.



The patient was given aspirin and 600 mg of Plavix.  The patient will be

continued on IV heparin.  The patient is not on any medications prior to this;

hence, will be started on high dose statin, in addition to beta blockers.  The

patient is hemodynamically stable.  The patient is in sinus rhythm at the end of

the procedure.





DD: 06/26/2020 10:18

DT: 06/26/2020 10:50

JOB# [id number]  [id number]

OWEN/JAN CASTELLANOS

## 2020-06-26 NOTE — EMERGENCY DEPARTMENT REPORT
HPI





- General


Chief Complaint: Chest Pain


Time Seen by Provider: 20 07:30





- HPI


HPI: 





Room 5








The patient is a 59-year-old female present with a chief complaint of chest 

pain.  The patient states she was awakened this morning at 06: 3 0 with chest p

ain and shortness of breath.  Patient describes the pain is sharp in nature.  

Patient states she has nausea/vomiting and diaphoresis associated with her chest

pain.  The patient currently gives her chest pain a score of 10/10.  The patient

states she's never had a cardiac catheterization











ED Past Medical Hx





- Past Medical History


Previous Medical History?: Yes


Hx Arthritis: Yes


Hx Asthma: Yes


Additional medical history: SAH.  brain aneurysm.  See HPI





- Surgical History


Past Surgical History?: Yes


Additional Surgical History: ANEURYSM REPAIR.   X 2





- Family History


Family history: no significant





- Social History


Smoking Status: Current Every Day Smoker (1 pack/day)


Substance Use Type: None (Denies illicit drug use), Alcohol (Occasional)





- Medications


Home Medications: 


                                Home Medications











 Medication  Instructions  Recorded  Confirmed  Last Taken  Type


 


guaiFENesin ER [Mucinex ER] 600 mg PO Q12H 19 History


 


Albuterol INH(or & Nicu Only) 2 puff IH QID PRN #1 inhalation 10/01/19  Unknown 

Rx





[ProAir HFA Inhaler]     


 


Amoxicillin/Potassium Clav 1 each PO BID #6 tablet 10/01/19  Unknown Rx





[Augmentin 875-125 Tablet]     


 


Nicotine [Habitrol] 14 mg TD QDAY #30 patch 10/01/19  Unknown Rx


 


Ibuprofen [Motrin 600 MG tab] 600 mg PO Q8H PRN #30 tablet 20  Unknown Rx














ED Review of Systems


ROS: 


Stated complaint: BACK PAIN


Other details as noted in HPI





Constitutional: diaphoresis


Eyes: denies: eye pain


ENT: denies: throat pain


Respiratory: shortness of breath


Cardiovascular: chest pain


Endocrine: no symptoms reported


Gastrointestinal: nausea, vomiting


Genitourinary: denies: dysuria


Musculoskeletal: denies: back pain


Neurological: denies: headache





Physical Exam





- Physical Exam


Vital Signs: 


                                   Vital Signs











  20





  07:00


 


Temperature 97.5 F L


 


Pulse Rate 54 L


 


Respiratory 20





Rate 


 


Blood Pressure 173/85


 


O2 Sat by Pulse 96





Oximetry 











Physical Exam: 





GENERAL: The patient is well-developed well-nourished female lying on stretcher 

appearing to be in mild discomfort. []


HEENT: Normocephalic.  Atraumatic.  Extraocular motions are intact.  Patient has

 moist mucous membranes.


NECK: Supple.  Trachea midline


CHEST/LUNGS: Clear to auscultation.  There is no respiratory distress noted.


HEART/CARDIOVASCULAR: Regular.  There is no tachycardia.  There is no gallop rub

 or murmur.


ABDOMEN: Abdomen is soft, nontender.  Patient has normal bowel sounds.  There is

 no abdominal distention.


SKIN: There is no rash.  There is no edema.  There is no diaphoresis.


NEURO: The patient is awake, alert, and oriented.  The patient is cooperative. 

The patient has normal speech


MUSCULOSKELETAL: There is no evidence of acute injury.





ED Course


                                   Vital Signs











  20





  07:00


 


Temperature 97.5 F L


 


Pulse Rate 54 L


 


Respiratory 20





Rate 


 


Blood Pressure 173/85


 


O2 Sat by Pulse 96





Oximetry 














- Consultations


Consultation #1: 





20 07:25


Code STEMI called and EKG sent to cardiologist








20 07:28


Case discussed with Dr. Navarrete








ED Medical Decision Making





- Lab Data


Result diagrams: 


                                 20 07:40





                                 20 07:40





                                Laboratory Tests











  20





  07:40 07:40 07:40


 


WBC  5.7  


 


RBC  4.16  


 


Hgb  12.2  


 


Hct  37.0  


 


MCV  89  


 


MCH  29  


 


MCHC  33  


 


RDW  14.3  


 


Plt Count  307  


 


Lymph % (Auto)  19.5  


 


Mono % (Auto)  4.6  


 


Eos % (Auto)  0.7  


 


Baso % (Auto)  0.4  


 


Lymph #  1.1 L  


 


Mono #  0.3  


 


Eos #  0.0  


 


Baso #  0.0  


 


Seg Neutrophils %  74.8 H  


 


Seg Neutrophils #  4.3  


 


PT    14.0


 


INR    1.07


 


APTT    23.9 L


 


Sodium   139 


 


Potassium   4.2 


 


Chloride   101.7 


 


Carbon Dioxide   22 


 


Anion Gap   20 


 


BUN   10 


 


Creatinine   0.8 


 


Estimated GFR   > 60 


 


BUN/Creatinine Ratio   13 


 


Glucose   95 


 


Calcium   9.5 


 


Troponin T   < 0.010 














- EKG Data


-: EKG Interpreted by Me


EKG shows normal: sinus rhythm


Rate: normal





- EKG Data


When compared to previous EKG there are: previous EKG unavailable


Interpretation: acute MI (ST elevation inferiorly.  ST depressions high 

laterally)





- Differential Diagnosis


STEMI


Critical care attestation.: 


If time is entered above; I have spent that time in minutes in the direct care 

of this critically ill patient, excluding procedure time.








ED Disposition


Clinical Impression: 


 STEMI (ST elevation myocardial infarction), Chest pain





Disposition: 09 OP ADMIT IP TO THIS HOSP


Is pt being admited?: Yes


Does the pt Need Aspirin: Yes


Condition: Serious


Instructions:  Chest Pain (ED)


Referrals: 


PRIMARY CARE,MD [Primary Care Provider] - 3-5 Days


Time of Disposition: 07:45 (Awaiting Cath Lab)

## 2020-06-26 NOTE — HISTORY AND PHYSICAL REPORT
History of Present Illness


Date of examination: 06/26/20


Date of admission: 


06/26/2020


Chief complaint: 





chest pain


History of present illness: 


The patient is a 59-year-old  with no significant past medical hx except for 

Brain Aneurysm and recent knee pain evaluated but on no medications. Presented 

to the ED with complaint of chest pain.  The patient states she was awakened 

this morning at 06: 30 with chest pain and shortness of breath.  Patient 

describes the pain is sharp in nature.  Patient states she has nausea/vomiting 

and diaphoresis associated with her chest pain.  The patient currently gives her

chest pain a score of 10/10.  She also reports that initially this pain was in 

the back. The patient states she's never had a cardiac catheterization. She was 

noted to have coronary artery disease requiring stent placement.


Post stent placement, patient reports no further chest pain, nausea or vomiting.


She is being admitted in the ICU for overnight management











Past History


Past Medical History: other (Brain Aneurysm)


Social history: no significant social history, full code


Family history: no significant family history





Medications and Allergies


                                    Allergies











Allergy/AdvReac Type Severity Reaction Status Date / Time


 


No Known Allergies Allergy   Verified 07/17/18 08:57











                                Home Medications











 Medication  Instructions  Recorded  Confirmed  Last Taken  Type


 


guaiFENesin ER [Mucinex ER] 600 mg PO Q12H 09/27/19 06/26/20 09/26/19 History


 


Albuterol INH(or & Nicu Only) 2 puff IH QID PRN #1 inhalation 10/01/19 06/26/20 

Unknown Rx





[ProAir HFA Inhaler]     


 


Nicotine [Habitrol] 14 mg TD QDAY #30 patch 10/01/19 06/26/20 Unknown Rx


 


Ibuprofen [Motrin 600 MG tab] 600 mg PO Q8H PRN #30 tablet 06/11/20 06/25/20 Rx





    1 tab 











Active Meds: 


Active Medications





Acetaminophen (Tylenol)  650 mg PO Q6H PRN


   PRN Reason: Pain MILD(1-3)/Fever >100.5/HA


Albuterol (Proventil)  2.5 mg IH Q3HRT PRN


   PRN Reason: Shortness Of Breath


Aspirin (Baby Aspirin)  81 mg PO QDAY EMILIE


Atorvastatin Calcium (Lipitor)  40 mg PO QHS [facility]


Heparin Sodium/Sodium Chloride (Heparin/ 0.45% Nacl-25,000 Unit/500 Ml)  25,000 

unit in 500 mls @ 14 mls/hr IV TITRATE EMILIE; Protocol


Sodium Chloride (Nacl 0.9% 1000 Ml)  1,000 mls @ 42 mls/hr IV ONCE ONE


   Stop: 06/27/20 09:09


Morphine Sulfate (Morphine)  2 mg IV Q4H PRN


   PRN Reason: Pain, Moderate (4-6)


Senna (Senokot)  8.6 mg PO BID EMILIE


Sodium Chloride (Sodium Chloride Flush Syringe 10 Ml)  10 ml IV BID EMILIE


Sodium Chloride (Sodium Chloride Flush Syringe 10 Ml)  10 ml IV PRN PRN


   PRN Reason: LINE FLUSH











Exam





- Constitutional


Vitals: 


                                        











Temp Pulse Resp BP Pulse Ox


 


 97.5 F L  67   20   165/99   98 


 


 06/26/20 07:00  06/26/20 07:26  06/26/20 09:00  06/26/20 07:26  06/26/20 09:00











General appearance: Present: no acute distress





- EENT


Eyes: Present: PERRL


ENT: hearing intact, clear oral mucosa





- Neck


Neck: Present: supple, normal ROM





- Respiratory


Respiratory effort: normal


Respiratory: bilateral: CTA





- Cardiovascular


Rhythm: regular


Heart Sounds: Present: S1 & S2.  Absent: systolic murmur, diastolic murmur





- Extremities


Extremities: no ischemia, pulses intact, pulses symmetrical, No edema, normal 

temperature, normal color, Full ROM





- Abdominal


General gastrointestinal: Present: soft, non-tender, non-distended, normal bowel

sounds





- Integumentary


Integumentary: Present: clear, warm





- Musculoskeletal


Musculoskeletal: strength equal bilaterally





- Psychiatric


Psychiatric: appropriate mood/affect, intact judgment & insight, memory intact, 

cooperative





- Neurologic


Neurologic: CNII-XII intact, moves all extremities





- Allied Health


Allied health notes reviewed: nursing, case management





HEART Score





- HEART Score


Age: 45-65


Troponin: 


                                        











Troponin T  < 0.010 ng/mL (0.00-0.029)   06/26/20  07:40    














Results





- Labs


CBC & Chem 7: 


                                 06/26/20 07:40





                                 06/26/20 07:40


Labs: 


                             Laboratory Last Values











WBC  5.7 K/mm3 (4.5-11.0)   06/26/20  07:40    


 


RBC  4.16 M/mm3 (3.65-5.03)   06/26/20  07:40    


 


Hgb  12.2 gm/dl (10.1-14.3)   06/26/20  07:40    


 


Hct  37.0 % (30.3-42.9)   06/26/20  07:40    


 


MCV  89 fl (79-97)   06/26/20  07:40    


 


MCH  29 pg (28-32)   06/26/20  07:40    


 


MCHC  33 % (30-34)   06/26/20  07:40    


 


RDW  14.3 % (13.2-15.2)   06/26/20  07:40    


 


Plt Count  307 K/mm3 (140-440)   06/26/20  07:40    


 


Lymph % (Auto)  19.5 % (13.4-35.0)   06/26/20  07:40    


 


Mono % (Auto)  4.6 % (0.0-7.3)   06/26/20  07:40    


 


Eos % (Auto)  0.7 % (0.0-4.3)   06/26/20  07:40    


 


Baso % (Auto)  0.4 % (0.0-1.8)   06/26/20  07:40    


 


Lymph #  1.1 K/mm3 (1.2-5.4)  L  06/26/20  07:40    


 


Mono #  0.3 K/mm3 (0.0-0.8)   06/26/20  07:40    


 


Eos #  0.0 K/mm3 (0.0-0.4)   06/26/20  07:40    


 


Baso #  0.0 K/mm3 (0.0-0.1)   06/26/20  07:40    


 


Seg Neutrophils %  74.8 % (40.0-70.0)  H  06/26/20  07:40    


 


Seg Neutrophils #  4.3 K/mm3 (1.8-7.7)   06/26/20  07:40    


 


PT  14.0 Sec. (12.2-14.9)   06/26/20  07:40    


 


INR  1.07  (0.87-1.13)   06/26/20  07:40    


 


APTT  23.9 Sec. (24.2-36.6)  L  06/26/20  07:40    


 


Sodium  139 mmol/L (137-145)   06/26/20  07:40    


 


Potassium  4.2 mmol/L (3.6-5.0)   06/26/20  07:40    


 


Chloride  101.7 mmol/L ()   06/26/20  07:40    


 


Carbon Dioxide  22 mmol/L (22-30)   06/26/20  07:40    


 


Anion Gap  20 mmol/L  06/26/20  07:40    


 


BUN  10 mg/dL (7-17)   06/26/20  07:40    


 


Creatinine  0.8 mg/dL (0.7-1.2)   06/26/20  07:40    


 


Estimated GFR  > 60 ml/min  06/26/20  07:40    


 


BUN/Creatinine Ratio  13 %  06/26/20  07:40    


 


Glucose  95 mg/dL ()   06/26/20  07:40    


 


Calcium  9.5 mg/dL (8.4-10.2)   06/26/20  07:40    


 


Troponin T  < 0.010 ng/mL (0.00-0.029)   06/26/20  07:40    











Jasso/IV: 


                                        





IV Catheter Type [Left Wrist]    INT / Saline Lock











Assessment and Plan


Assessment and plan: 


The patient is a 59-year-old  with no significant past medical hx except for 

Brain Aneurysm and recent knee pain evaluated but on no medications. Presented 

to the ED with complaint of chest pain.  The patient states she was awakened 

this morning at 06: 30 with chest pain and shortness of breath.  Patient 

describes the pain is sharp in nature.  Patient states she has nausea/vomiting 

and diaphoresis associated with her chest pain.  The patient currently gives her

chest pain a score of 10/10.  She also reports that initially this pain was in 

the back. The patient states she's never had a cardiac catheterization. She was 

noted to have coronary artery disease requiring stent placement.


Post stent placement, patient reports no further chest pain, nausea or vomiting.


She is being admitted in the ICU for overnight management.





Pt presented with acute inferior STEMI, s/p LHC this AM via groin approach with 

PCI to mid RCA - see dictated cath report. Initiate DAPT with  and 

Plavix, initiate lipitor and lopressor. Obtain echo. Will follow. 








STEMI


Hypertension


HX OF Brain Aneursym








Plan


s/p LHC with PCI to mid RCA


Admit to ICU for at least 24 hrs


DAPT with  and Plavix, initiate lipitor and lopressor


Cardiology consulted


Consult CCT


Monitor Groin


Cardiac diet


Obtain echo


DVT/GI prophy





The high probability of a clinically significant, sudden or life threatening 

deterioration of the [cardiology] system(s) required my full and direct 

attention, intervention and personal management. The aggregate critical care t

kareen was [95] minutes. This time is in addition to time spent performing reported

 procedures but includes the following: 





[x] Data Review and interpretation 





[x] Patient assessment and monitoring of vital signs 





[x] Documentation 





[x] Medication orders and management


Advance Directives: Yes


Plan of care discussed with patient/family: Yes

## 2020-06-26 NOTE — CONSULTATION
History of Present Illness


Consult date: 20


Requesting physician: IFEOMA OSORIO


History of present illness: 





The patient is a 59-year-old  with no significant past medical hx except for 

Brain Aneurysm and recent knee pain evaluated but on no medications. Presented 

to the ED with complaint of chest pain.  The patient states she was awakened 

this morning at 06: 30 with chest pain and shortness of breath.  Patient 

describes the pain is sharp in nature.  Patient states she has nausea/vomiting 

and diaphoresis associated with her chest pain.  The patient currently gives her

chest pain a score of 10/10.  She also reports that initially this pain was in 

the back. The patient states she's never had a cardiac catheterization. She was 

noted to have coronary artery disease requiring stent placement.


Post stent placement, patient reports no further chest pain, nausea or vomiting.


She is being admitted in the ICU for overnight management


I have been consulted for critical care management.











- Past Medical History


Previous Medical History?: Yes


Hx Arthritis: Yes


Hx Asthma: Yes


Additional medical history: SAH.  brain aneurysm.  See HPI





- Surgical History


Past Surgical History?: Yes


Additional Surgical History: ANEURYSM REPAIR.   X 2





- Family History


Family history: no significant





- Social History


Smoking Status: Current Every Day Smoker (1 pack/day)


Substance Use Type: None (Denies illicit drug use), Alcohol (Occasional)














REVIEW OF SYSTEMS


Constitutional: diaphoresis


Eyes: denies: eye pain


ENT: denies: throat pain


Respiratory: shortness of breath


Cardiovascular: chest pain


Endocrine: no symptoms reported


Gastrointestinal: nausea, vomiting


Genitourinary: denies: dysuria


Musculoskeletal: denies: back pain


Neurological: denies: headache








Medications and Allergies


                                    Allergies











Allergy/AdvReac Type Severity Reaction Status Date / Time


 


No Known Allergies Allergy   Verified 18 08:57











                                Home Medications











 Medication  Instructions  Recorded  Confirmed  Last Taken  Type


 


guaiFENesin ER [Mucinex ER] 600 mg PO Q12H 19 History


 


Albuterol INH(or & Nicu Only) 2 puff IH QID PRN #1 inhalation 10/01/19 06/26/20 

Unknown Rx





[ProAir HFA Inhaler]     


 


Nicotine [Habitrol] 14 mg TD QDAY #30 patch 10/01/19 06/26/20 Unknown Rx


 


Ibuprofen [Motrin 600 MG tab] 600 mg PO Q8H PRN #30 tablet 20 Rx





    1 tab 











Active Meds: 


Active Medications





Acetaminophen (Tylenol)  650 mg PO Q6H PRN


   PRN Reason: Pain MILD(1-3)/Fever >100.5/HA


Albuterol (Proventil)  2.5 mg IH Q3HRT PRN


   PRN Reason: Shortness Of Breath


Aspirin (Baby Aspirin)  81 mg PO QDAY EMILIE


Atorvastatin Calcium (Lipitor)  40 mg PO QHS EMILIE


Heparin Sodium/Sodium Chloride (Heparin/ 0.45% Nacl-25,000 Unit/500 Ml)  25,000 

unit in 500 mls @ 14 mls/hr IV TITRATE EMILIE; Protocol


Sodium Chloride (Nacl 0.9% 1000 Ml)  1,000 mls @ 42 mls/hr IV ONCE ONE


   Stop: 20 09:09


Morphine Sulfate (Morphine)  2 mg IV Q4H PRN


   PRN Reason: Pain, Moderate (4-6)


Senna (Senokot)  8.6 mg PO BID EMILIE


Sodium Chloride (Sodium Chloride Flush Syringe 10 Ml)  10 ml IV BID EMILIE


Sodium Chloride (Sodium Chloride Flush Syringe 10 Ml)  10 ml IV PRN PRN


   PRN Reason: LINE FLUSH











Physical Examination


Vital signs: 


                                   Vital Signs











Temp Pulse Resp BP Pulse Ox


 


 97.5 F L  54 L  20   173/85   96 


 


 20 07:00  20 07:00  20 07:00  20 07:00  20 07:00











Vitals reviewed.


General appearance: Present: no acute distress





- EENT


Eyes: Present: PERRL


ENT: hearing intact, clear oral mucosa





- Neck


Neck: Present: supple, normal ROM





- Respiratory


Respiratory effort: normal


Respiratory: bilateral: CTA





- Cardiovascular


Rhythm: regular


Heart Sounds: Present: S1 & S2.  Absent: systolic murmur, diastolic murmur





- Extremities


Extremities: no ischemia, pulses intact, pulses symmetrical, No edema, normal 

temperature, normal color, Full ROM





- Abdominal


General gastrointestinal: Present: soft, non-tender, non-distended, normal bowel

sounds





- Integumentary


Integumentary: Present: clear, warm





- Musculoskeletal


Musculoskeletal: strength equal bilaterally





- Psychiatric


Psychiatric: appropriate mood/affect, intact judgment & insight, memory intact, 

cooperative





- Neurologic


Neurologic: CNII-XII intact, moves all extremities











Results





- Laboratory Findings


CBC and BMP: 


                                 20 06:14





                                 20 06:14


PT/INR, D-dimer











PT  14.0 Sec. (12.2-14.9)   20  07:40    


 


INR  1.07  (0.87-1.13)   20  07:40    








Abnormal lab findings: 


                                  Abnormal Labs











  20





  07:40 07:40


 


Lymph #  1.1 L 


 


Seg Neutrophils %  74.8 H 


 


APTT   23.9 L














Assessment and Plan


Acute inferior wall STEMI, s/p LHC this AM via groin approach with PCI to mid 

RCA 


Hypertension


HX OF Brain Aneursym


Tobacco use disorder/Nicotine dependnece








Recommendations


Admit to ICU for at least 24 hrs


DAPT with  and Plavix, initiate lipitor and lopressor


Cardioprotective measures


Monitor for femoral pseudoaneurysm or hematoma


Monitor hemodynamics closely


Cardiac diet


Obtain echocardiogram


DVT prophylaxis


Nicotine withdrawal precautions


Smoking cessation counselling

## 2020-06-26 NOTE — XRAY REPORT
CHEST 1 VIEW



INDICATION: 

Chest Pain



COMPARISON: 

9/27/2019



FINDINGS:



Support devices: None 



Heart: Normal and unchanged 



Lungs/Pleura: Minimal parenchymal density in the right base is most suggestive of atelectasis and/or 
scarring. No convincing evidence of acute pulmonary disease.  





IMPRESSION:

1. No acute disease.



Signer Name: Danny Grey MD 

Signed: 6/26/2020 7:53 AM

Workstation Name: [id number]

## 2020-06-27 LAB
BASOPHILS # (AUTO): 0 K/MM3 (ref 0–0.1)
BASOPHILS NFR BLD AUTO: 0.3 % (ref 0–1.8)
BUN SERPL-MCNC: 8 MG/DL (ref 7–17)
BUN/CREAT SERPL: 11 %
CALCIUM SERPL-MCNC: 8.6 MG/DL (ref 8.4–10.2)
EOSINOPHIL # BLD AUTO: 0 K/MM3 (ref 0–0.4)
EOSINOPHIL NFR BLD AUTO: 0.6 % (ref 0–4.3)
HCT VFR BLD CALC: 33.9 % (ref 30.3–42.9)
HEMOLYSIS INDEX: 7
HGB BLD-MCNC: 11.1 GM/DL (ref 10.1–14.3)
LYMPHOCYTES # BLD AUTO: 0.7 K/MM3 (ref 1.2–5.4)
LYMPHOCYTES NFR BLD AUTO: 14.6 % (ref 13.4–35)
MCHC RBC AUTO-ENTMCNC: 33 % (ref 30–34)
MCV RBC AUTO: 88 FL (ref 79–97)
MONOCYTES # (AUTO): 0.3 K/MM3 (ref 0–0.8)
MONOCYTES % (AUTO): 6 % (ref 0–7.3)
PLATELET # BLD: 245 K/MM3 (ref 140–440)
RBC # BLD AUTO: 3.83 M/MM3 (ref 3.65–5.03)

## 2020-06-27 RX ADMIN — MORPHINE SULFATE PRN MG: 2 INJECTION, SOLUTION INTRAMUSCULAR; INTRAVENOUS at 15:03

## 2020-06-27 RX ADMIN — METOPROLOL TARTRATE SCH MG: 50 TABLET, FILM COATED ORAL at 10:11

## 2020-06-27 RX ADMIN — Medication SCH ML: at 22:18

## 2020-06-27 RX ADMIN — CLOPIDOGREL BISULFATE SCH MG: 75 TABLET ORAL at 10:10

## 2020-06-27 RX ADMIN — SENNOSIDES SCH MG: 8.6 TABLET, FILM COATED ORAL at 22:17

## 2020-06-27 RX ADMIN — SENNOSIDES SCH MG: 8.6 TABLET, FILM COATED ORAL at 10:10

## 2020-06-27 RX ADMIN — MORPHINE SULFATE PRN MG: 2 INJECTION, SOLUTION INTRAMUSCULAR; INTRAVENOUS at 22:18

## 2020-06-27 RX ADMIN — Medication SCH: at 13:04

## 2020-06-27 RX ADMIN — METOPROLOL TARTRATE SCH MG: 50 TABLET, FILM COATED ORAL at 22:17

## 2020-06-27 RX ADMIN — ASPIRIN SCH MG: 325 TABLET, COATED ORAL at 10:10

## 2020-06-27 RX ADMIN — Medication SCH ML: at 10:11

## 2020-06-27 NOTE — PROGRESS NOTE
Assessment and Plan





clinically improved


cont asa/plavix/statin/bb


tte reviewed


may transfer to tele





- Patient Problems


(1) Chest pain


Current Visit: Yes   Status: Acute   





(2) STEMI (ST elevation myocardial infarction)


Current Visit: Yes   Status: Acute   





(3) Alcoholism


Current Visit: No   Status: Acute   





(4) Anemia


Current Visit: No   Status: Acute   


Qualifiers: 


   Anemia type: unspecified type   Qualified Code(s): D64.9 - Anemia, 

unspecified   





(5) Bilateral pneumonia


Current Visit: No   Status: Acute   


Qualifiers: 


   Pneumonia type: due to unspecified organism   Lung location: lower lobe of 

lung 





(6) Chronic liver disease


Current Visit: No   Status: Acute   





Subjective


Date of service: 06/27/20


Interval history: 





no complaints 


feel better


discussed smoking cessation for ~ 5 min





Objective


                                   Vital Signs











  Temp Pulse Resp BP Pulse Ox


 


 06/27/20 08:00  98.5 F    


 


 06/27/20 05:46   67  16  119/67 


 


 06/27/20 05:30   65  15  119/67 


 


 06/27/20 05:16   70  14  116/58 


 


 06/27/20 05:00   68  15  128/68 


 


 06/27/20 04:46   66  14  110/62 


 


 06/27/20 04:30   65  14  116/58 


 


 06/27/20 04:16   64  13  123/67 


 


 06/27/20 04:02  98.2 F    


 


 06/27/20 04:00   65  16  110/62 


 


 06/27/20 03:46   66  15  123/67 


 


 06/27/20 03:30   68  14  123/67 


 


 06/27/20 03:16   69  15  129/72 


 


 06/27/20 03:00   60  13  132/76 


 


 06/27/20 02:45   61  13  129/72 


 


 06/27/20 02:30   66  13  129/72 


 


 06/27/20 02:16   65  13  132/76 


 


 06/27/20 02:00   66  13  132/76 


 


 06/27/20 01:47  98.4 F    


 


 06/27/20 01:46   66  13  130/74 


 


 06/27/20 01:30   68  10 L  130/74 


 


 06/27/20 01:16   69  13  132/74 


 


 06/27/20 01:00   69  12  132/74 


 


 06/27/20 00:46   64  11 L  134/76 


 


 06/27/20 00:30   66  13  134/76 


 


 06/27/20 00:16   81  17  158/84 


 


 06/27/20 00:00   72  15  158/84 


 


 06/26/20 23:46   68  12  140/77 


 


 06/26/20 23:30   66  13  140/77 


 


 06/26/20 23:16   69  11 L  138/70 


 


 06/26/20 23:00   62  15  138/70 


 


 06/26/20 22:46   66  15  150/85 


 


 06/26/20 22:30   74  14  150/85 


 


 06/26/20 22:24   69   152/85 


 


 06/26/20 22:16   79  14  152/85 


 


 06/26/20 22:00   72  23  139/75 


 


 06/26/20 21:46   66  13  139/75 


 


 06/26/20 21:30   63  13  139/75 


 


 06/26/20 21:16   61  12  134/66 


 


 06/26/20 21:00   59 L  14  134/66 


 


 06/26/20 20:46   61  13  


 


 06/26/20 20:37    14  


 


 06/26/20 20:30   62  19  152/88  77 L


 


 06/26/20 20:16   60  12  155/80  100


 


 06/26/20 20:00   66  10 L  134/92  100


 


 06/26/20 19:46   59 L  13  134/92  100


 


 06/26/20 19:33  98.8 F    


 


 06/26/20 19:30   56 L  11 L  134/92  100


 


 06/26/20 19:16   59 L  16  134/92  100


 


 06/26/20 19:00   57 L  13  134/92  100


 


 06/26/20 18:46   65  12  134/92  100


 


 06/26/20 18:41    14  


 


 06/26/20 18:30      84


 


 06/26/20 18:00   70  14  134/71  100


 


 06/26/20 17:45   70  13  134/80  100


 


 06/26/20 17:15   61  13  143/79  92


 


 06/26/20 16:45   61  13  145/81  96


 


 06/26/20 16:15   66  9 L  140/79  96


 


 06/26/20 15:45   61  10 L  155/78  92


 


 06/26/20 15:15   69  11 L  139/81  94


 


 06/26/20 14:45   64  13  147/80  94


 


 06/26/20 14:15   56 L  12  163/80  96


 


 06/26/20 13:45   56 L  15  150/100  96


 


 06/26/20 13:15   68  12  150/90  98


 


 06/26/20 13:00   62  12  139/100  98


 


 06/26/20 12:45   60  12  138/83  100


 


 06/26/20 12:30   62  11 L  148/81  94


 


 06/26/20 12:15   58 L  12  144/77  94


 


 06/26/20 12:14    11 L  


 


 06/26/20 12:00   61  12  132/75  96


 


 06/26/20 11:45   69  12  125/70  96


 


 06/26/20 11:30   61  12  130/73  93


 


 06/26/20 11:15   66  11 L  131/83  92


 


 06/26/20 11:00   68  13  135/75 


 


 06/26/20 10:45   65  13  125/65  91


 


 06/26/20 10:30   62  14  134/73  99


 


 06/26/20 10:15   63  14  141/75  93














- Labs and Meds


                                 Cardiac Enzymes











  06/26/20 06/27/20 Range/Units





  18:43 06:14 


 


CK-MB (CK-2)  81.7 H  30.1 H  (0.0-4.0)  ng/mL








                                     Lipids











  06/26/20 Range/Units





  18:43 


 


Triglycerides  87  (2-149)  mg/dL


 


Cholesterol  146  ()  mg/dL


 


HDL Cholesterol  81 H  (40-59)  mg/dL


 


Cholesterol/HDL Ratio  1.80  %








                                       CBC











  06/26/20 06/27/20 Range/Units





  18:43 06:14 


 


WBC  4.4 L  4.9  (4.5-11.0)  K/mm3


 


RBC  3.89  3.83  (3.65-5.03)  M/mm3


 


Hgb  11.1  11.1  (10.1-14.3)  gm/dl


 


Hct  34.4  33.9  (30.3-42.9)  %


 


Plt Count  235  245  (140-440)  K/mm3


 


Lymph #  0.9 L  0.7 L  (1.2-5.4)  K/mm3


 


Mono #  0.4  0.3  (0.0-0.8)  K/mm3


 


Eos #  0.0  0.0  (0.0-0.4)  K/mm3


 


Baso #  0.0  0.0  (0.0-0.1)  K/mm3








                          Comprehensive Metabolic Panel











  06/26/20 06/27/20 Range/Units





  18:43 06:14 


 


Sodium  136 L  132 L  (137-145)  mmol/L


 


Potassium  4.4  3.9  (3.6-5.0)  mmol/L


 


Chloride  100.3  96.8 L  ()  mmol/L


 


Carbon Dioxide  23  25  (22-30)  mmol/L


 


BUN  9  8  (7-17)  mg/dL


 


Creatinine  0.7  0.7  (0.7-1.2)  mg/dL


 


Glucose  104 H  108 H  ()  mg/dL


 


Calcium  8.5  8.6  (8.4-10.2)  mg/dL

## 2020-06-27 NOTE — PROGRESS NOTE
Assessment and Plan








Acute inferior wall STEMI, s/p LHC (PCI to mid RCA)


Hypertension


HX OF Brain Aneursym


Tobacco use disorder/Nicotine dependence





- improved; no new issues; continue care as below





- DAPT with  and Plavix, initiate lipitor and lopressor


- other cardioprotective measures per cardiology team


- Monitor for femoral pseudoaneurysm or hematoma


- Monitor hemodynamics closely


- Cardiac diet


- follow echocardiogram


- DVT prophylaxis


- Nicotine withdrawal precautions


- supplemental oxygen to keep O2 sats > 90%


- prn bronchodilators with pulm hygiene per RT


- avoid nephrotoxins, renally dose all medications


- mobility protocols to prevent pressure ulcers


- PT/OT as tolerated


- accuchecks with glycemic control per SSI for target blood glucose < 180 mg/dL 


- Smoking cessation strongly counseled at the bedside  


- GI & VTE prophylaxis


- Flu & pneumovax per protocol


- prn analgesia per pain score


- continue other care per attending / other consultants\





... re-evaluate in am & prn





Subjective


Date of service: 06/27/20


Principal diagnosis: STEMI; Hypertension; h/o Brain Aneursym; Tobacco use 

disorder


Interval history: 





Patient is seen today for: Acute inferior wall STEMI, s/p LHC (PCI to mid RCA); 

Hypertension; HX OF Brain Aneursym; Tobacco use disorder/Nicotine dependence





Seen and examined at bedside; 24hour events reviewed; nursing and respiratory 

care staff consulted; no adverse overnight events reported to me; resting 

peacefully in bed; looks and feels better; denies acute chest pains or 

palpitations; off oxygen; No N/V/F/C





Objective


                               Vital Signs - 12hr











  06/27/20 06/27/20 06/27/20





  04:00 04:02 04:16


 


Temperature  98.2 F 


 


Pulse Rate 65  64


 


Respiratory 16  13





Rate   


 


Blood Pressure 110/62  123/67














  06/27/20 06/27/20 06/27/20





  04:30 04:46 05:00


 


Temperature   


 


Pulse Rate 65 66 68


 


Respiratory 14 14 15





Rate   


 


Blood Pressure 116/58 110/62 128/68














  06/27/20 06/27/20 06/27/20





  05:16 05:30 05:46


 


Temperature   


 


Pulse Rate 70 65 67


 


Respiratory 14 15 16





Rate   


 


Blood Pressure 116/58 119/67 119/67














  06/27/20 06/27/20 06/27/20





  06:00 07:00 08:00


 


Temperature   98.5 F


 


Pulse Rate 65 63 69


 


Respiratory 12 20 12





Rate   


 


Blood Pressure 123/72 123/67 121/72














  06/27/20 06/27/20 06/27/20





  09:00 09:50 10:00


 


Temperature   


 


Pulse Rate 76 66 63


 


Respiratory 14  15





Rate   


 


Blood Pressure 112/69  129/70














  06/27/20 06/27/20





  10:11 11:00


 


Temperature  


 


Pulse Rate 64 66


 


Respiratory  12





Rate  


 


Blood Pressure 129/74 134/66











Constitutional: no acute distress, alert


Eyes: non-icteric


ENT: oropharynx moist


Neck: supple, no lymphadenopathy, no JVD


Effort: normal


Ascultation: Bilateral: clear, diminished breath sounds


Percussion: Bilateral: not dull


Cardiovascular: regular rate and rhythm


Gastrointestinal: normoactive bowel sounds, soft, non-tender, non-distended


Integumentary: rash


Extremities: no cyanosis, no edema, pulses normal, no ischemia or petechiae


Neurologic: normal mental status, non-focal exam, pupils equal and round, CN II-

XII normal


Psychiatric: mood appropriate, affect normal


CBC and BMP: 


                                 06/28/20 05:27





                                 06/27/20 06:14


ABG, PT/INR, D-dimer: 


PT/INR, D-dimer











PT  14.0 Sec. (12.2-14.9)   06/26/20  07:40    


 


INR  1.07  (0.87-1.13)   06/26/20  07:40    








Abnormal lab findings: 


                                  Abnormal Labs











  06/26/20 06/26/20 06/26/20





  07:40 07:40 18:43


 


WBC   


 


Mono % (Auto)   


 


Lymph #  1.1 L  


 


Seg Neutrophils %  74.8 H  


 


APTT   23.9 L 


 


Sodium   


 


Chloride   


 


Glucose   


 


POC Glucose   


 


Total Creatine Kinase   


 


CK-MB (CK-2)   


 


CK-MB (CK-2) Rel Index   


 


Troponin T    1.830 H* D


 


HDL Cholesterol    81 H














  06/26/20 06/26/20 06/26/20





  18:43 18:43 23:12


 


WBC  4.4 L  


 


Mono % (Auto)  9.4 H  


 


Lymph #  0.9 L  


 


Seg Neutrophils %   


 


APTT   


 


Sodium   136 L 


 


Chloride   


 


Glucose   104 H 


 


POC Glucose    206 H


 


Total Creatine Kinase   873 H 


 


CK-MB (CK-2)   81.7 H 


 


CK-MB (CK-2) Rel Index   9.3 H 


 


Troponin T   


 


HDL Cholesterol   














  06/27/20 06/27/20 06/27/20





  05:55 06:14 06:14


 


WBC   


 


Mono % (Auto)   


 


Lymph #   0.7 L 


 


Seg Neutrophils %   78.5 H 


 


APTT   


 


Sodium    132 L


 


Chloride    96.8 L


 


Glucose    108 H


 


POC Glucose  118 H  


 


Total Creatine Kinase    546 H


 


CK-MB (CK-2)    30.1 H


 


CK-MB (CK-2) Rel Index    5.5 H


 


Troponin T    1.000 H* D


 


HDL Cholesterol   











Chest x-ray: image reviewed (cardiomegaly; mild interstitial edema)


Allied health notes reviewed: nursing

## 2020-06-27 NOTE — XRAY REPORT
CHEST 1 VIEW



INDICATION: 

post pci.



COMPARISON: 

One day prior.



FINDINGS:



Support devices: None



Heart: Stable. 



Lungs/Pleura: No acute pulmonary or pleural findings.  







IMPRESSION:

1. No significant change.



Signer Name: Rafal Stoll MD 

Signed: 6/27/2020 4:58 AM

Workstation Name: [id number]

## 2020-06-27 NOTE — PROGRESS NOTE
Assessment and Plan


Assessment and plan: 


The patient is a 59-year-old  with no significant past medical hx except for 

Brain Aneurysm and recent knee pain evaluated but on no medications. Presented 

to the ED with complaint of chest pain.  The patient states she was awakened 

this morning at 06: 30 with chest pain and shortness of breath.  Patient 

describes the pain is sharp in nature.  Patient states she has nausea/vomiting 

and diaphoresis associated with her chest pain.  The patient currently gives her

chest pain a score of 10/10.  She also reports that initially this pain was in 

the back. The patient states she's never had a cardiac catheterization. She was 

noted to have coronary artery disease requiring stent placement.


Post stent placement, patient reports no further chest pain, nausea or vomiting.


She is being admitted in the ICU for overnight management.





Pt presented with acute inferior STEMI, s/p LHC this AM via groin approach with 

PCI to mid RCA - see dictated cath report. Initiate DAPT with  and 

Plavix, initiate lipitor and lopressor. Obtain echo. Will follow. 





6/27: Clinically stable.  Transfer to telemetry continue current management.  

Extensive counseling provided against tobacco use time spent on counseling 15 

minutes





Atypical chest pain secondary to CAD


STEMI


Hypertension


HX OF Brain Aneursym


Tobacco use disorder








Plan


s/p LHC with PCI to mid RCA


DAPT with  and Plavix, initiate lipitor and lopressor


Cardiology consulted


Monitor Groin


Cardiac diet


Obtain echo


DVT/GI prophy


Anticipate discharge in 24 hours





History


Interval history: 


Patient seen and examined clinically stable no adverse event reported overnight.








Hospitalist Physical





- Physical exam


Narrative exam: 


VITAL SIGNS:  Reviewed.    


GENERAL:  The patient appears normally developed, Vital signs as documented.


HEAD:  No signs of head trauma.


EYES:  Pupils are equal.  Extraocular motions intact.  


EARS:  Hearing grossly intact.


MOUTH:  Oropharynx is normal. 


NECK:  No adenopathy, no JVD.  


CHEST:  Chest with clear breath sounds bilaterally.  No wheezes, rales, or 

rhonchi.  


CARDIAC:  Regular rate and rhythm.  S1 and S2, without murmurs, gallops, or 

rubs.


VASCULAR:  No Edema.  Peripheral pulses normal and equal in all extremities.


ABDOMEN:  Soft, non tender and non distended.  No   rebound or guarding, and no 

masses palpated.   Bowel Sounds normal.


MUSCULOSKELETAL:  Good range of motion of all major joints. Extremities without 

clubbing, cyanosis or edema.  


NEUROLOGIC EXAM:  Alert and oriented x 3   No focal sensory or strength 

deficits.   Speech normal.  Follows commands.


PSYCHIATRIC:  Mood normal.


SKIN:  detial exam as documented in skin assessment








- Constitutional


Vitals: 


                                        











Temp Pulse Resp BP Pulse Ox


 


 98.5 F   67   16   119/67   77 L


 


 06/27/20 08:00  06/27/20 05:46  06/27/20 05:46  06/27/20 05:46  06/26/20 20:30











General appearance: Present: no acute distress





HEART Score





- HEART Score


Age: 45-65


Troponin: 


                                        











Troponin T  1.000 ng/mL (0.00-0.029)  H* D 06/27/20  06:14    














Results





- Labs


CBC & Chem 7: 


                                 06/27/20 06:14





                                 06/27/20 06:14


Labs: 


                             Laboratory Last Values











WBC  4.9 K/mm3 (4.5-11.0)   06/27/20  06:14    


 


RBC  3.83 M/mm3 (3.65-5.03)   06/27/20  06:14    


 


Hgb  11.1 gm/dl (10.1-14.3)   06/27/20  06:14    


 


Hct  33.9 % (30.3-42.9)   06/27/20  06:14    


 


MCV  88 fl (79-97)   06/27/20  06:14    


 


MCH  29 pg (28-32)   06/27/20  06:14    


 


MCHC  33 % (30-34)   06/27/20  06:14    


 


RDW  13.8 % (13.2-15.2)   06/27/20  06:14    


 


Plt Count  245 K/mm3 (140-440)   06/27/20  06:14    


 


Lymph % (Auto)  14.6 % (13.4-35.0)   06/27/20  06:14    


 


Mono % (Auto)  6.0 % (0.0-7.3)   06/27/20  06:14    


 


Eos % (Auto)  0.6 % (0.0-4.3)   06/27/20  06:14    


 


Baso % (Auto)  0.3 % (0.0-1.8)   06/27/20  06:14    


 


Lymph #  0.7 K/mm3 (1.2-5.4)  L  06/27/20  06:14    


 


Mono #  0.3 K/mm3 (0.0-0.8)   06/27/20  06:14    


 


Eos #  0.0 K/mm3 (0.0-0.4)   06/27/20  06:14    


 


Baso #  0.0 K/mm3 (0.0-0.1)   06/27/20  06:14    


 


Seg Neutrophils %  78.5 % (40.0-70.0)  H  06/27/20  06:14    


 


Seg Neutrophils #  3.9 K/mm3 (1.8-7.7)   06/27/20  06:14    


 


PT  14.0 Sec. (12.2-14.9)   06/26/20  07:40    


 


INR  1.07  (0.87-1.13)   06/26/20  07:40    


 


APTT  23.9 Sec. (24.2-36.6)  L  06/26/20  07:40    


 


Sodium  132 mmol/L (137-145)  L  06/27/20  06:14    


 


Potassium  3.9 mmol/L (3.6-5.0)   06/27/20  06:14    


 


Chloride  96.8 mmol/L ()  L  06/27/20  06:14    


 


Carbon Dioxide  25 mmol/L (22-30)   06/27/20  06:14    


 


Anion Gap  14 mmol/L  06/27/20  06:14    


 


BUN  8 mg/dL (7-17)   06/27/20  06:14    


 


Creatinine  0.7 mg/dL (0.7-1.2)   06/27/20  06:14    


 


Estimated GFR  > 60 ml/min  06/27/20  06:14    


 


BUN/Creatinine Ratio  11 %  06/27/20  06:14    


 


Glucose  108 mg/dL ()  H  06/27/20  06:14    


 


POC Glucose  118  ()  H  06/27/20  05:55    


 


Calcium  8.6 mg/dL (8.4-10.2)   06/27/20  06:14    


 


Total Creatine Kinase  546 units/L ()  H  06/27/20  06:14    


 


CK-MB (CK-2)  30.1 ng/mL (0.0-4.0)  H  06/27/20  06:14    


 


CK-MB (CK-2) Rel Index  5.5  (0-4)  H  06/27/20  06:14    


 


Troponin T  1.000 ng/mL (0.00-0.029)  H* D 06/27/20  06:14    


 


Triglycerides  87 mg/dL (2-149)   06/26/20  18:43    


 


Cholesterol  146 mg/dL ()   06/26/20  18:43    


 


LDL Cholesterol Direct  58 mg/dL ()   06/26/20  18:43    


 


HDL Cholesterol  81 mg/dL (40-59)  H  06/26/20  18:43    


 


Cholesterol/HDL Ratio  1.80 %  06/26/20  18:43    














- Diagnostic Impressions


Diagnostic Impressions: 








Echocardiogram  06/26/20 11:01


Transthoracic Echocardiogram


 


Indication:


Stemi 


BP:           131/83


HR:           56


 


Conclusions


 *The left ventricular chamber size is normal.


 *Global left ventricular systolic function is normal.


 *The estimated ejection fraction is 50-55%. 


 *Abnormal left ventricular diastolic filling is observed, consistent


with impaired relaxation. 


 *The left atrial chamber size is normal.


 *The right ventricular systolic pressure is calculated at 35 mmHg. 


 *There is mild to moderate aortic regurgitation. 


 *The aortic valve leaflets are mildly thickened.


 *Patient with inferior wall STEMI,no significant wall motion


abnormalities noted.


 


Findings     


Left Ventricle:


The left ventricular chamber size is normal. Global left ventricular


systolic function is normal. The estimated ejection fraction is 50-55%. 


Abnormal left ventricular diastolic filling is observed, consistent with


impaired relaxation.  


 


Left Atrium:


The left atrial chamber size is normal. 


 


Right Ventricle:


The right ventricular cavity size is normal. 


 


Right Atrium:


The right atrial cavity size is normal. 


 


Aortic Valve:


The aortic valve is trileaflet. The aortic valve leaflets are mildly


thickened. There is mild to moderate aortic regurgitation.  


 


Mitral Valve:


The mitral valve leaflets are mildly thickened. There is mild mitral


regurgitation.  


 


Tricuspid Valve:


The tricuspid valve leaflets are normal.  There is mild tricuspid


regurgitation. The right ventricular systolic pressure is calculated at


35 mmHg.  


 


Pulmonic Valve:


The pulmonic valve appears normal. There is trace pulmonic


regurgitation.  


 


Pericardium:


There is no pericardial effusion. 


 


Aorta:


There is no dilatation of the aortic root. 


 


Venous:


The inferior vena cava appears normal in size. There is less than 50%


respiratory change in the inferior vena cava dimension. 


 


Measurements     


Chambers 2D


Name                    Value         Normal Range            


IVSd (2D)               0.99 cm       (0.6 - 1.1)             


LVPWd (2D)              0.92 cm       (0.6 - 1.1)             


LVIDd (2D)              4.34 cm       (3.7 - 5.6)             


LVIDs (2D)              2.67 cm       (2 - 3.8)               


LV FS (2D)              38.44 %       -                        


EF Teichholz (2D)       69 %          -                        


Ao root diameter (2D)   2.38 cm       (2 - 3.7)               


 


Volumes/Mass


Name                    Value         Normal Range            


LA ESV SP 4CH (A/L)     33.82 ml      -                        


LA ESV SP 2CH (A/L)     25.44 ml      -                        


LA ESV BP (A/L)         31.95 ml      -                        


LA ESV BP (A/L) index   15.74 ml/m2   -                        


LA ESV SP 4CH (MOD)     31.43 ml      -                        


LA ESV SP 2CH (MOD)     25.4 ml       -                        


LA ESV BP (MOD)         30.74 ml      -                        


LA ESV BP (MOD) index   15.14 ml/m2   -                        


 


Diastolic/Systolic Function


Name                    Value         Normal Range            


MV E-wave Vmax          0.82 m/sec    -                        


MV deceleration time    187.8 msec    -                        


MV A-wave Vmax          1.02 m/sec    -                        


MV E:A ratio            0.8 ratio     -                        


 


Aortic Valve


Name                    Value         Normal Range            


AV Vmax                 1.84 m/sec    -                        


AV VTI                  37.5 cm       -                        


AV peak gradient        13.56 mmHg    -                        


AV mean gradient        6.57 mmHg     -                        


LVOT diameter           2 cm          -                        


LVOT Vmax               1.36 m/sec    -                        


LVOT VTI                26.53 cm      -                        


LVOT peak gradient      7.36 mmHg     -                        


LVOT mean gradient      3.51 mmHg     -                        


SV LVOT                 83.23 ml      -                        


RAYSA (continuity Vmax)   2.31 cm2      -                        


RAYSA (continuity VTI)    2.22 cm2      -                        


AR PHT                  522.37 msec   -                        


AR peak gradient        95.75 mmHg    -                        


 


Mitral Valve


Name                    Value         Normal Range            


MR Vmax                 4.84 m/sec    -                        


 


Tricuspid Valve


Name                    Value         Normal Range            


TR Vmax                 2.83 m/sec    -                        


TR peak gradient        32 mmHg       -                        


RAP                     3 mmHg        -                        


RVSP                    35 mmHg       -                        


IVC diameter            1.48 cm       (1.2 - 2.3)             


 


Pulmonic Valve/Qp:Qs


Name                    Value         Normal Range            


PV Vmax                 0.65 m/sec    -                        


PV peak gradient        1.69 mmHg     -                        


PV acceleration time    95.15 msec    -                        


 


Electronically signed by: Sang Navarrete MD on 06/26/2020


17:21:57











Jasso/IV: 


                                        





IV Catheter Type [Right Wrist]   Peripheral IV


IV Catheter Type [Left Wrist]    INT / Saline Lock











Active Medications





- Current Medications


Current Medications: 














Generic Name Dose Route Start Last Admin





  Trade Name Freq  PRN Reason Stop Dose Admin


 


Acetaminophen  650 mg  06/26/20 09:21 





  Tylenol  PO  





  Q6H PRN  





  Pain MILD(1-3)/Fever >100.5/HA  


 


Albuterol  2.5 mg  06/26/20 09:21 





  Proventil  IH  





  Q3HRT PRN  





  Shortness Of Breath  


 


Aspirin  325 mg  06/27/20 10:00 





  Ecotrin  PO  





  QDAY EMILIE  


 


Atorvastatin Calcium  80 mg  06/26/20 22:00  06/26/20 22:24





  Lipitor  PO   80 mg





  QHS EMILIE   Administration


 


Clopidogrel Bisulfate  75 mg  06/27/20 10:00 





  Plavix  PO  





  QDAY EMILIE  


 


Metoprolol Tartrate  50 mg  06/26/20 22:00  06/26/20 22:24





  Metoprolol  PO   50 mg





  BID EMILIE   Administration


 


Morphine Sulfate  2 mg  06/26/20 09:21  06/26/20 22:49





  Morphine  IV   2 mg





  Q4H PRN   Administration





  Pain, Moderate (4-6)  


 


Senna  8.6 mg  06/26/20 10:00  06/26/20 22:23





  Senokot  PO   8.6 mg





  BID EMILIE   Administration


 


Sodium Chloride  10 ml  06/26/20 10:00  06/26/20 22:25





  Sodium Chloride Flush Syringe 10 Ml  IV   10 ml





  BID EMILIE   Administration


 


Sodium Chloride  10 ml  06/26/20 09:21 





  Sodium Chloride Flush Syringe 10 Ml  IV  





  PRN PRN  





  LINE FLUSH  














Nutrition/Malnutrition Assess





- Dietary Evaluation


Nutrition/Malnutrition Findings: 


                                        





Nutrition Notes                                            Start:  06/26/20 

11:19


Freq:                                                      Status: Active       




Protocol:                                                                       




 Document     06/26/20 11:19  LP  (Rec: 06/26/20 11:23  LP  [id number])


 Nutrition Notes


     Need for Assessment generated from:         MD Order


     Initial or Follow up                        Brief Note


     Other Pertinent Diagnosis                   N/V, STEMI


     Current Diet                                Cardiac


     Subjective/Other Information                Consult for diet education.


 Nutrition Intervention


     Follow-Up By:                               06/28/20


     Additional Comments                         Follow for diet education

## 2020-06-28 VITALS — SYSTOLIC BLOOD PRESSURE: 120 MMHG | DIASTOLIC BLOOD PRESSURE: 62 MMHG

## 2020-06-28 LAB
HCT VFR BLD CALC: 33.1 % (ref 30.3–42.9)
HGB BLD-MCNC: 10.9 GM/DL (ref 10.1–14.3)
PLATELET # BLD: 226 K/MM3 (ref 140–440)

## 2020-06-28 RX ADMIN — CLOPIDOGREL BISULFATE SCH MG: 75 TABLET ORAL at 10:58

## 2020-06-28 RX ADMIN — Medication SCH ML: at 10:58

## 2020-06-28 RX ADMIN — SENNOSIDES SCH MG: 8.6 TABLET, FILM COATED ORAL at 10:58

## 2020-06-28 RX ADMIN — MORPHINE SULFATE PRN MG: 2 INJECTION, SOLUTION INTRAMUSCULAR; INTRAVENOUS at 08:54

## 2020-06-28 RX ADMIN — METOPROLOL TARTRATE SCH MG: 50 TABLET, FILM COATED ORAL at 10:58

## 2020-06-28 RX ADMIN — ASPIRIN SCH MG: 325 TABLET, COATED ORAL at 10:58

## 2020-06-28 NOTE — PROGRESS NOTE
Assessment and Plan





clinically stable


cont asa/plavix/statin/bb


tte reviewed w/ pt


pt ambulating w/o sxs


right groin is stable


I spent a sig amount of time discussing importance of compliance w/ DAPT and 

smoking cessation w/ pt.








- Patient Problems


(1) Chest pain


Current Visit: Yes   Status: Acute   





(2) STEMI (ST elevation myocardial infarction)


Current Visit: Yes   Status: Acute   





(3) Alcoholism


Current Visit: No   Status: Acute   





(4) Anemia


Current Visit: No   Status: Acute   


Qualifiers: 


   Anemia type: unspecified type   Qualified Code(s): D64.9 - Anemia, 

unspecified   





(5) Bilateral pneumonia


Current Visit: No   Status: Acute   


Qualifiers: 


   Pneumonia type: due to unspecified organism   Lung location: lower lobe of 

lung 





(6) Chronic liver disease


Current Visit: No   Status: Acute   





Subjective


Principal diagnosis: STEMI; Hypertension; h/o Brain Aneursym; Tobacco use 

disorder


Interval history: 





no complaints 


feel better


discussed smoking cessation for ~ 5 min





Objective


                                   Vital Signs











  Temp Pulse Pulse Resp Resp BP Pulse Ox


 


 06/28/20 09:07        96


 


 06/28/20 07:43  97.5 F L  62   18   120/62  96


 


 06/28/20 05:27  98.2 F  60   20   102/62  98


 


 06/28/20 00:10  98.8 F  77   20   117/66  95


 


 06/27/20 22:48     18   


 


 06/27/20 22:25      18  


 


 06/27/20 22:20    74  18    96


 


 06/27/20 22:18     18   


 


 06/27/20 22:17   74     132/71 


 


 06/27/20 20:45  98.3 F  74   20   132/71  96


 


 06/27/20 15:53  98.6 F  71   18   126/80  97


 


 06/27/20 11:30   76   20   126/69 


 


 06/27/20 11:00   66   12   134/66 














- Labs and Meds


                                       CBC











  06/28/20 Range/Units





  05:27 


 


Hgb  10.9  (10.1-14.3)  gm/dl


 


Hct  33.1  (30.3-42.9)  %


 


Plt Count  226  (140-440)  K/mm3

## 2020-06-28 NOTE — PROGRESS NOTE
Assessment and Plan








Acute inferior wall STEMI, s/p LHC (PCI to mid RCA)


Hypertension


HX OF Brain Aneursym


Tobacco use disorder/Nicotine dependence





- improved; no new issues; continue care as below





- DAPT with  and Plavix, initiate lipitor and lopressor


- other cardioprotective measures per cardiology team


- Monitor for femoral pseudoaneurysm or hematoma


- Monitor hemodynamics closely


- Cardiac diet


- follow echocardiogram


- DVT prophylaxis


- Nicotine withdrawal precautions


- supplemental oxygen to keep O2 sats > 90%


- prn bronchodilators with pulm hygiene per RT


- avoid nephrotoxins, renally dose all medications


- mobility protocols to prevent pressure ulcers


- PT/OT as tolerated


- accuchecks with glycemic control per SSI for target blood glucose < 180 mg/dL 


- Smoking cessation strongly counseled at the bedside  


- GI & VTE prophylaxis


- Flu & pneumovax per protocol


- prn analgesia per pain score


- continue other care per attending / other consultants\





... re-evaluate in am & prn








Subjective


Date of service: 06/28/20


Principal diagnosis: STEMI; Hypertension; h/o Brain Aneursym; Tobacco use 

disorder


Interval history: 





Patient is seen today for: Acute inferior wall STEMI, s/p LHC (PCI to mid RCA); 

Hypertension; HX OF Brain Aneursym; Tobacco use disorder/Nicotine dependence





Seen and examined at bedside; 24hour events reviewed; nursing and respiratory 

care staff consulted; no adverse overnight events reported to me; resting 

peacefully in bed; 





Objective


                               Vital Signs - 12hr











  06/28/20 06/28/20 06/28/20





  05:27 07:43 09:07


 


Temperature 98.2 F 97.5 F L 


 


Pulse Rate 60 62 


 


Respiratory 20 18 





Rate   


 


Blood Pressure 102/62 120/62 


 


O2 Sat by Pulse 98 96 96





Oximetry   











Constitutional: no acute distress, alert


Eyes: non-icteric


ENT: oropharynx moist


Neck: supple, no lymphadenopathy, no JVD


Effort: normal


Ascultation: Bilateral: clear, diminished breath sounds


Percussion: Bilateral: not dull


Cardiovascular: regular rate and rhythm


Gastrointestinal: normoactive bowel sounds, soft, non-tender, non-distended


Integumentary: rash


Extremities: no cyanosis, no edema, pulses normal, no ischemia or petechiae


Neurologic: normal mental status, non-focal exam, pupils equal and round, CN II-

XII normal


Psychiatric: mood appropriate, affect normal


CBC and BMP: 


                                 06/28/20 05:27





                                 06/27/20 06:14


ABG, PT/INR, D-dimer: 


PT/INR, D-dimer











PT  14.0 Sec. (12.2-14.9)   06/26/20  07:40    


 


INR  1.07  (0.87-1.13)   06/26/20  07:40    








Abnormal lab findings: 


                                  Abnormal Labs











  06/26/20 06/26/20 06/26/20





  07:40 07:40 18:43


 


WBC   


 


Mono % (Auto)   


 


Lymph #  1.1 L  


 


Seg Neutrophils %  74.8 H  


 


APTT   23.9 L 


 


Sodium   


 


Chloride   


 


Glucose   


 


POC Glucose   


 


Total Creatine Kinase   


 


CK-MB (CK-2)   


 


CK-MB (CK-2) Rel Index   


 


Troponin T    1.830 H* D


 


HDL Cholesterol    81 H














  06/26/20 06/26/20 06/26/20





  18:43 18:43 23:12


 


WBC  4.4 L  


 


Mono % (Auto)  9.4 H  


 


Lymph #  0.9 L  


 


Seg Neutrophils %   


 


APTT   


 


Sodium   136 L 


 


Chloride   


 


Glucose   104 H 


 


POC Glucose    206 H


 


Total Creatine Kinase   873 H 


 


CK-MB (CK-2)   81.7 H 


 


CK-MB (CK-2) Rel Index   9.3 H 


 


Troponin T   


 


HDL Cholesterol   














  06/27/20 06/27/20 06/27/20





  05:55 06:14 06:14


 


WBC   


 


Mono % (Auto)   


 


Lymph #   0.7 L 


 


Seg Neutrophils %   78.5 H 


 


APTT   


 


Sodium    132 L


 


Chloride    96.8 L


 


Glucose    108 H


 


POC Glucose  118 H  


 


Total Creatine Kinase    546 H


 


CK-MB (CK-2)    30.1 H


 


CK-MB (CK-2) Rel Index    5.5 H


 


Troponin T    1.000 H* D


 


HDL Cholesterol   











Allied health notes reviewed: nursing

## 2020-06-28 NOTE — DISCHARGE SUMMARY
Providers





- Providers


Date of Admission: 


06/26/20 09:21





Attending physician: 


IFEOMA OSORIO MD





                                        





06/26/20


Consult to Cardiac Rehabilitation [CONS] Routine 


   Reason For Exam: post pci





06/26/20 09:22


Consult to Dietitian/Nutrition [CONS] Routine 


   Physician Instructions: 


   Reason For Exam: 


   Reason for Consult: Diet education


Consult to Physician [CONS] Routine 


   Comment: 


   Consulting Provider: FILI ALEJANDRE


   Physician Instructions: 


   Reason For Exam: STEMI





06/26/20 09:25


Consult to Physician [CONS] Routine 


   Comment: 


   Consulting Provider: YARELIS CAST


   Physician Instructions: 


   Reason For Exam: STEMI











Primary care physician: 


PRIMARY CARE MD








Hospitalization


Condition: Serious


Hospital course: 


The patient is a 59-year-old  with no significant past medical hx except for 

Brain Aneurysm and recent knee pain evaluated but on no medications. Presented 

to the ED with complaint of chest pain.  The patient states she was awakened 

this morning at 06: 30 with chest pain and shortness of breath.  Patient 

describes the pain is sharp in nature.  Patient states she has nausea/vomiting 

and diaphoresis associated with her chest pain.  The patient currently gives her

 chest pain a score of 10/10.  She also reports that initially this pain was in 

the back. The patient states she's never had a cardiac catheterization. She was 

noted to have coronary artery disease requiring stent placement.


Post stent placement, patient reports no further chest pain, nausea or vomiting.


She is being admitted in the ICU for overnight management.





Pt presented with acute inferior STEMI, s/p LHC this AM via groin approach with 

PCI to mid RCA - see dictated cath report. Initiate DAPT with  and 

Plavix, initiate lipitor and lopressor. Obtain echo. Will follow. 





6/27: Clinically stable.  Transfer to telemetry continue current management.  

Extensive counseling provided against tobacco use time spent on counseling 15 

minutes





6/28: Will discharge today if ok with cardiology, smoking cessation discussed 

again and patient verbalized understanding. Medications and side effects 

discussed in detail. No new complaints from the patient. 





Atypical chest pain secondary to CAD


STEMI


Hypertension


HX OF Brain Aneursym


Tobacco use disorder








Plan


s/p LHC with PCI to mid RCA


DAPT with  and Plavix, initiate lipitor and lopressor


Cardiology consulted


Monitor Groin


Cardiac diet


Obtain echo


DVT/GI prophy


Anticipate discharge in 24 hours








Disposition: DC-01 TO HOME OR SELFCARE


Time spent for discharge: 35 mins





Exam





- Constitutional


Vitals: 


                                        











Temp Pulse Resp BP Pulse Ox


 


 97.5 F L  62   18   120/62   96 


 


 06/28/20 07:43  06/28/20 07:43  06/28/20 07:43  06/28/20 07:43  06/28/20 09:07














Plan


Activity: advance as tolerated, fall precautions


Diet: low fat


Special Instructions: record daily BP diary, smoking cessation


Follow up with: 


PRIMARY CARE,MD [Primary Care Provider] - 3-5 Days


HORTENSIA DE JESUS MD [Staff Physician] - 7 Days


Forms:  CardCat PCI D/C Instructions


Prescriptions: 


AtorvaSTATin [Lipitor] 80 mg PO QHS #30 tablet


Aspirin EC [Ecotrin] 325 mg PO QDAY #30 tablet


Nicotine [Habitrol] 14 mg TD QDAY #7 patch


Metoprolol [Lopressor TAB] 50 mg PO BID #60 tablet


Clopidogrel [Plavix] 75 mg PO QDAY #30 tablet


Pantoprazole [Protonix TAB] 40 mg PO QDAY #30 tablet


traMADoL [Ultram] 50 mg PO Q6HR PRN #14 tablet


 PRN Reason: Pain

## 2020-11-15 ENCOUNTER — HOSPITAL ENCOUNTER (EMERGENCY)
Dept: HOSPITAL 5 - ED | Age: 60
Discharge: HOME | End: 2020-11-15
Payer: MEDICAID

## 2020-11-15 VITALS — DIASTOLIC BLOOD PRESSURE: 114 MMHG | SYSTOLIC BLOOD PRESSURE: 148 MMHG

## 2020-11-15 DIAGNOSIS — Z98.890: ICD-10-CM
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
## 2020-11-15 DIAGNOSIS — J45.909: ICD-10-CM

## 2020-11-15 DIAGNOSIS — S63.501A: Primary | ICD-10-CM
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
## 2020-11-15 DIAGNOSIS — Z79.899: ICD-10-CM

## 2020-11-15 DIAGNOSIS — S63.91XA: ICD-10-CM

## 2020-11-15 DIAGNOSIS — M19.91: ICD-10-CM
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
## 2020-11-15 DIAGNOSIS — F17.200: ICD-10-CM
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
## 2020-11-15 PROCEDURE — 73130 X-RAY EXAM OF HAND: CPT

## 2020-11-15 PROCEDURE — 96372 THER/PROPH/DIAG INJ SC/IM: CPT

## 2020-11-15 PROCEDURE — 73090 X-RAY EXAM OF FOREARM: CPT

## 2020-11-15 PROCEDURE — 99283 EMERGENCY DEPT VISIT LOW MDM: CPT

## 2020-11-15 NOTE — XRAY REPORT
RIGHT HAND 3 VIEW(S)



INDICATION / CLINICAL INFORMATION:

pain s/p fall



COMPARISON:

None available.

 

FINDINGS:



BONES / JOINT(S): No acute fracture or subluxation. Moderate arthrosis is noted throughout the IP vandana
nts and the first CMC joint. Carpal arcs are intact. Generalized osteopenia.



SOFT TISSUES: No significant abnormality.



ADDITIONAL FINDINGS: None.







Signer Name: Steve Mccullough MD 

Signed: 11/15/2020 8:39 AM

Workstation Name: [id number]

## 2020-11-15 NOTE — XRAY REPORT
RIGHT FOREARM 2 VIEW



INDICATION / CLINICAL INFORMATION:

fall, injury.



COMPARISON:

None available.

 

FINDINGS:

BONES/JOINT(S): No acute fracture or subluxation. No significant degenerative changes.



SOFT TISSUES: No significant abnormality.



ADDITIONAL FINDINGS: None.







Signer Name: Mike Coyne MD 

Signed: 11/15/2020 7:45 AM

Workstation Name: [id number]

## 2020-11-15 NOTE — EMERGENCY DEPARTMENT REPORT
ED Upper Extremity Inj HPI





- General


Chief Complaint: Extremity Injury, Upper


Stated Complaint: RT WRIST PAIN


Time Seen by Provider: 11/15/20 08:03


Source: patient


Mode of arrival: Ambulatory


Limitations: No Limitations





- History of Present Illness


Initial Comments: 





This is a 59-year-old female nontoxic, well nourished in appearance, no acute 

signs of distress presents to the ED with c/o of right hand/wrist pain s/p 

mechanical trip and fall yesterday.  Patient stated that she had a fall to her 

hand/wrist area while trying to break the fall.  Patient denies any other 

trauma.  Denies any head or neck or back injuries or pain.  Patient denies any 

numbness, tingling, fever, chills, nausea, vomiting, chest pain, shortness of 

breath, headache, stiff neck.  Patient denies any joint swelling or joint 

redness.  Patient stated has decreased range of motion due to pain.  Patient 

denies any allergies.


MD Complaint: Injury to:: right, wrist, hand


-: days(s)


Other Extremity Injury: Hand: Right, Wrist: Right


Other Injuries: none


Place: outdoors


Severity scale (0 -10): 8


Improves With: immobilization


Worsens With: movement of extremity


Context: fall


Associated Symptoms: denies other symptoms.  denies: weakness, numbness, neck 

pain, suspects foreign body, nausea/vomiting, heard/felt popping sensat





- Related Data


                                Home Medications











 Medication  Instructions  Recorded  Confirmed  Last Taken


 


guaiFENesin ER [Mucinex ER] 600 mg PO Q12H 19








                                  Previous Rx's











 Medication  Instructions  Recorded  Last Taken  Type


 


Albuterol Mdi (or & Nicu Only) 2 puff IH QID PRN #1 inhalation 10/01/19 Unknown 

Rx





[ProAir HFA Inhaler]    


 


Aspirin EC [Ecotrin] 325 mg PO QDAY #30 tablet 20 Unknown Rx


 


AtorvaSTATin [Lipitor] 80 mg PO QHS #30 tablet 20 Unknown Rx


 


Clopidogrel [Plavix] 75 mg PO QDAY #30 tablet 20 Unknown Rx


 


Metoprolol [Lopressor TAB] 50 mg PO BID #60 tablet 20 Unknown Rx


 


Nicotine [Habitrol] 14 mg TD QDAY #7 patch 20 Unknown Rx


 


Pantoprazole [Protonix TAB] 40 mg PO QDAY #30 tablet 20 Unknown Rx


 


traMADoL [Ultram] 50 mg PO Q6HR PRN #14 tablet 20 Unknown Rx


 


Naproxen 500 mg PO Q12H PRN #12 tablet 11/15/20 Unknown Rx











                                    Allergies











Allergy/AdvReac Type Severity Reaction Status Date / Time


 


No Known Allergies Allergy   Verified 18 08:57














ED Review of Systems


ROS: 


Stated complaint: RT WRIST PAIN


Other details as noted in HPI





Comment: All other systems reviewed and negative


Constitutional: denies: chills, fever


Eyes: denies: eye pain, eye discharge, vision change


ENT: denies: ear pain, throat pain


Respiratory: denies: cough, shortness of breath, wheezing


Cardiovascular: denies: chest pain, palpitations


Endocrine: no symptoms reported


Gastrointestinal: denies: abdominal pain, nausea, diarrhea


Genitourinary: denies: urgency, dysuria, discharge


Musculoskeletal: denies: back pain, joint swelling, arthralgia


Skin: denies: rash, lesions


Neurological: denies: headache, weakness, paresthesias


Psychiatric: denies: anxiety, depression


Hematological/Lymphatic: denies: easy bleeding, easy bruising





ED Past Medical Hx





- Past Medical History


Previous Medical History?: Yes


Hx Hypertension: No


Hx Heart Attack/AMI: No


Hx Congestive Heart Failure: No


Hx Diabetes: No


Hx Deep Vein Thrombosis: No


Hx Pulmonary Embolism: No


Hx Liver Disease: No


Hx Renal Disease: No


Hx Sickle Cell Disease: No


Hx Arthritis: Yes


Hx Seizures: No


Hx Kidney Stones: No


Hx Asthma: Yes


Hx COPD: No


Hx Tuberculosis: No


Hx Dementia: No


Hx HIV: No


Additional medical history: SAH.  brain aneurysm.  See HPI





- Surgical History


Past Surgical History?: Yes


Hx Coronary Stent: No


Hx Pacemaker: No


Hx Internal Defibrillator: No


Additional Surgical History: ANEURYSM REPAIR.   X 2





- Social History


Smoking Status: Current Every Day Smoker





- Medications


Home Medications: 


                                Home Medications











 Medication  Instructions  Recorded  Confirmed  Last Taken  Type


 


guaiFENesin ER [Mucinex ER] 600 mg PO Q12H 19 History


 


Albuterol Mdi (or & Nicu Only) 2 puff IH QID PRN #1 inhalation 10/01/19 06/26/20

Unknown Rx





[ProAir HFA Inhaler]     


 


Aspirin EC [Ecotrin] 325 mg PO QDAY #30 tablet 20  Unknown Rx


 


AtorvaSTATin [Lipitor] 80 mg PO QHS #30 tablet 20  Unknown Rx


 


Clopidogrel [Plavix] 75 mg PO QDAY #30 tablet 20  Unknown Rx


 


Metoprolol [Lopressor TAB] 50 mg PO BID #60 tablet 20  Unknown Rx


 


Nicotine [Habitrol] 14 mg TD QDAY #7 patch 20  Unknown Rx


 


Pantoprazole [Protonix TAB] 40 mg PO QDAY #30 tablet 20  Unknown Rx


 


traMADoL [Ultram] 50 mg PO Q6HR PRN #14 tablet 20  Unknown Rx


 


Naproxen 500 mg PO Q12H PRN #12 tablet 11/15/20  Unknown Rx














ED Physical Exam





- General


Limitations: No Limitations


General appearance: alert, in no apparent distress





- Head


Head exam: Present: atraumatic, normocephalic





- Eye


Eye exam: Present: normal appearance





- Neck


Neck exam: Present: normal inspection, full ROM





- Respiratory


Respiratory exam: Absent: respiratory distress





- Cardiovascular


Cardiovascular Exam: Present: regular rate





- Extremities Exam


Extremities exam: Present: full ROM, tenderness, normal capillary refill.  

Absent: joint swelling





- Expanded Upper Extremity Exam


  ** Right


General: Present: normal inspection


Shoulder Exam: Present: normal inspection, full ROM.  Absent: tenderness, 

swelling


Upper Arm exam: Present: normal inspection, full ROM.  Absent: tenderness, 

swelling


Elbow exam: Present: normal inspection, full ROM.  Absent: tenderness, swelling,

 abrasion, laceration, ecchymosis, deformity, crepidus, dislocation, erythema, 

effusion, pain w/ pronation/supination, tenderness over radial head


Forearm Wrist exam: Present: full ROM, tenderness.  Absent: swelling, abrasion, 

laceration, ecchymosis, deformity, dislocation, erythema, tenderness over 

anatomical snuff box, pain with axial thumb loading


Hand Wrist exam: Present: full ROM, tenderness, swelling.  Absent: abrasion, 

laceration, ecchymosis, deformity, crepidus, dislocation, erythema, amputation, 

nail avulsion, subungual hematoma


Vascular: Present: normal capillary refill.  Absent: vascular compromise 

(Neurovascular within normal limits)





- Back Exam


Back exam: Present: normal inspection, full ROM.  Absent: tenderness, CVA 

tenderness (R), CVA tenderness (L), muscle spasm, paraspinal tenderness, 

vertebral tenderness, rash noted





- Neurological Exam


Neurological exam: Present: alert, oriented X3, normal gait





- Psychiatric


Psychiatric exam: Present: normal affect, normal mood





- Skin


Skin exam: Present: warm, dry, intact, normal color.  Absent: rash





ED Course





                                   Vital Signs











  11/15/20





  07:01


 


Temperature 98 F


 


Pulse Rate 71


 


Respiratory 16





Rate 


 


Blood Pressure 148/114





[Right] 


 


O2 Sat by Pulse 98





Oximetry 














- Reevaluation(s)


Reevaluation #1: 





11/15/20 09:05


Patient is speaking in full sentences with no signs of distress noted.





- Consultations


Consultation #1: 





11/15/20 09:05


Patient has been consulted with Dr. Fenton about patient history, physical 

exam, and imaging results and examined and agrees to ED plan of care and 

discharge plan of care.





ED Medical Decision Making





- Radiology Data











Referring Physician: VANE FIERRO


 


Patient Name: AUSTIN CIFUENTES


 


Patient ID: [id number]


 


YOB: 1960


 


Sex: Female


 


Accession: [id number]


 


Report Date: 2020-11-15


 


Report Status: Finalized








[address] [address] 

XRay Report Signed Patient: AUSTIN CIFUENTES MR#: [id number] : 

1960 Acct:[id number] Age/Sex: 59 / F ADM Date: 11/15/20 Loc: ED 

Attending Dr: Ordering Physician: VANE FIERRO NP Date of Service: 11/15/20 

Procedure(s): XR hand 3+V RT Accession Number(s): [id number] cc: VANE FIERRO NP 

Fluoro Time In Minutes: RIGHT HAND 3 VIEW(S) INDICATION / CLINICAL INFORMATION: 

pain s/p fall COMPARISON: None available. FINDINGS: BONES / JOINT(S): No acute 

fracture or subluxation. Moderate arthrosis is noted throughout the IP joints 

and the first CMC joint. Carpal arcs are intact. Generalized osteopenia. SOFT 

TISSUES: No significant abnormality. ADDITIONAL FINDINGS: None. Signer Name: 

Steve Guerrero MD Signed: 11/15/2020 8:39 AM Workstation Name: [id number]

 Transcribed By:  Dictated By: STEVE GUERRERO Electronically Authenticated 

By: STEVE GUERRERO Signed Date/Time: 11/15/20 0839 DD/DT: 11/15/20 0838 

TD/TT: 











Referring Physician: JUANITO CHAMBERLAIN


 


Patient Name: AUSTIN CIFUENTES


 


Patient ID: [id number]


 


YOB: 1960


 


Sex: Female


 


Accession: [id number]


 


Report Date: 2020-11-15


 


Report Status: Finalized








[facility] [address] [place] [address] 

XRay Report Signed Patient: AUSTIN CIFUENTES MR#: [id number] : [birth year]

[birth year] Acct:[id number] Age/Sex: 59 / F ADM Date: 11/15/20 Loc: ED Attending Dr: 

Ordering Physician: JUANITO CHAMBERLAIN MD Date of Service: 11/15/20 Procedure(s): XR 

forearm RT Accession Number(s): [id number] cc: JUANITO CHAMBERLAIN MD Fluoro Time In Minutes: 

RIGHT FOREARM 2 VIEW INDICATION / CLINICAL INFORMATION: fall, injury. 

COMPARISON: None available. FINDINGS: BONES/JOINT(S): No acute fracture or 

subluxation. No significant degenerative changes. SOFT TISSUES: No significant 

abnormality. ADDITIONAL FINDINGS: None. Signer Name: Mike Conye MD Signed: 

11/15/2020 7:45 AM Workstation Name: [id number] Transcribed By: NATALIA Dictated By:

 Mike Coyne MD Electronically Authenticated By: Mike Coyne MD Signed

 Date/Time: 11/15/20 0745 DD/DT: 11/15/20 0745 TD/TT: 





- Medical Decision Making





59-year-old female that presents with right wrist/hand strain.  Patient is 

stable and was examined by me.  I referred patient to an orthopedic doctor for 

further evaluation for possible MRI.  X-ray has been obtained and dictated by 

the radiologist.  Patient is notified of the x-ray report with noted by the 

patient.  No signs of compartment syndrome on physical exam.  No ecchymosis. no 

joint redness or swelling.  Patient received a wrist immobilizer.   Patient was 

instructed to RICE therapy.  Patient received Norco and Toradol for pain which 

stated is under control.  Patient stated family member will drive the patient 

home after discharge due to possible drowsiness.  Patient is discharged with 

Motrin. At time of discharge, the patient does not seem toxic or ill in 

appearance.  No acute signs of distress noted.  Patient agrees to discharge 

treatment plan of care.  No further questions noted by the patient.


Critical care attestation.: 


If time is entered above; I have spent that time in minutes in the direct care 

of this critically ill patient, excluding procedure time.








ED Disposition


Clinical Impression: 


Strain of right wrist


Qualifiers:


 Encounter type: initial encounter Qualified Code(s): S66.911A - Strain of 

unspecified muscle, fascia and tendon at wrist and hand level, right hand, 

initial encounter





Strain of right hand


Qualifiers:


 Encounter type: initial encounter Qualified Code(s): S66.911A - Strain of 

unspecified muscle, fascia and tendon at wrist and hand level, right hand, 

initial encounter





Fall


Qualifiers:


 Encounter type: initial encounter Qualified Code(s): W19.XXXA - Unspecified 

fall, initial encounter





Disposition:  TO HOME OR SELFCARE


Is pt being admited?: No


Does the pt Need Aspirin: No


Condition: Stable


Instructions:  RICE Therapy for Routine Care of Injuries, Easy-to-Read, Wrist 

Sprain, Adult


Additional Instructions: 


Follow-up with a orthopedic doctor in 3-5 days or if symptoms worsen and 

continue return to emergency room as soon as possible. 





No physical activity that extremity until cleared by orthopedic doctor


Prescriptions: 


Naproxen 500 mg PO Q12H PRN #12 tablet


 PRN Reason: Pain , Severe (7-10)


Referrals: 


PRIMARY CARE,MD [Primary Care Provider] - 3-5 Days


KULWANT WARNER MD [Staff Physician] - 3-5 Days


Forms:  Work/School Release Form(ED)